# Patient Record
Sex: MALE | Race: ASIAN | NOT HISPANIC OR LATINO | Employment: UNEMPLOYED | URBAN - METROPOLITAN AREA
[De-identification: names, ages, dates, MRNs, and addresses within clinical notes are randomized per-mention and may not be internally consistent; named-entity substitution may affect disease eponyms.]

---

## 2019-07-09 ENCOUNTER — MEDICAL CORRESPONDENCE (OUTPATIENT)
Dept: HEALTH INFORMATION MANAGEMENT | Facility: CLINIC | Age: 59
End: 2019-07-09

## 2019-07-09 ENCOUNTER — TRANSFERRED RECORDS (OUTPATIENT)
Dept: HEALTH INFORMATION MANAGEMENT | Facility: CLINIC | Age: 59
End: 2019-07-09

## 2019-10-02 ENCOUNTER — HEALTH MAINTENANCE LETTER (OUTPATIENT)
Age: 59
End: 2019-10-02

## 2019-11-07 ENCOUNTER — DOCUMENTATION ONLY (OUTPATIENT)
Dept: CARE COORDINATION | Facility: CLINIC | Age: 59
End: 2019-11-07

## 2019-11-14 ENCOUNTER — PRE VISIT (OUTPATIENT)
Dept: ORTHOPEDICS | Facility: CLINIC | Age: 59
End: 2019-11-14

## 2020-02-19 ENCOUNTER — TRANSCRIBE ORDERS (OUTPATIENT)
Dept: OTHER | Age: 60
End: 2020-02-19

## 2020-02-19 DIAGNOSIS — E11.9 TYPE II DIABETES MELLITUS (H): Primary | ICD-10-CM

## 2020-03-04 ENCOUNTER — PRE VISIT (OUTPATIENT)
Dept: ORTHOPEDICS | Facility: CLINIC | Age: 60
End: 2020-03-04

## 2020-03-22 ENCOUNTER — HEALTH MAINTENANCE LETTER (OUTPATIENT)
Age: 60
End: 2020-03-22

## 2020-06-24 ENCOUNTER — TRANSCRIBE ORDERS (OUTPATIENT)
Dept: OTHER | Age: 60
End: 2020-06-24

## 2020-06-24 DIAGNOSIS — E11.9 TYPE 2 DIABETES MELLITUS (H): Primary | ICD-10-CM

## 2020-06-29 ENCOUNTER — ANCILLARY PROCEDURE (OUTPATIENT)
Dept: ULTRASOUND IMAGING | Facility: CLINIC | Age: 60
End: 2020-06-29
Attending: INTERNAL MEDICINE
Payer: COMMERCIAL

## 2020-06-29 DIAGNOSIS — N18.30 CHRONIC KIDNEY DISEASE, STAGE III (MODERATE) (H): ICD-10-CM

## 2020-06-29 LAB — RADIOLOGIST FLAGS: NORMAL

## 2021-01-15 ENCOUNTER — HEALTH MAINTENANCE LETTER (OUTPATIENT)
Age: 61
End: 2021-01-15

## 2021-05-16 ENCOUNTER — HEALTH MAINTENANCE LETTER (OUTPATIENT)
Age: 61
End: 2021-05-16

## 2021-09-05 ENCOUNTER — HEALTH MAINTENANCE LETTER (OUTPATIENT)
Age: 61
End: 2021-09-05

## 2021-11-08 ENCOUNTER — LAB REQUISITION (OUTPATIENT)
Dept: LAB | Facility: CLINIC | Age: 61
End: 2021-11-08
Payer: COMMERCIAL

## 2021-11-08 DIAGNOSIS — I50.33 ACUTE ON CHRONIC DIASTOLIC (CONGESTIVE) HEART FAILURE (H): ICD-10-CM

## 2021-11-08 LAB
ANION GAP SERPL CALCULATED.3IONS-SCNC: 6 MMOL/L (ref 3–14)
BUN SERPL-MCNC: 14 MG/DL (ref 7–30)
CALCIUM SERPL-MCNC: 8.8 MG/DL (ref 8.5–10.1)
CHLORIDE BLD-SCNC: 104 MMOL/L (ref 94–109)
CHOLEST SERPL-MCNC: 171 MG/DL
CO2 SERPL-SCNC: 27 MMOL/L (ref 20–32)
CREAT SERPL-MCNC: 1.05 MG/DL (ref 0.66–1.25)
FASTING STATUS PATIENT QL REPORTED: NO
GFR SERPL CREATININE-BSD FRML MDRD: 77 ML/MIN/1.73M2
GLUCOSE BLD-MCNC: 139 MG/DL (ref 70–99)
HDLC SERPL-MCNC: 32 MG/DL
LDLC SERPL CALC-MCNC: 68 MG/DL
NONHDLC SERPL-MCNC: 139 MG/DL
POTASSIUM BLD-SCNC: 4.3 MMOL/L (ref 3.4–5.3)
SODIUM SERPL-SCNC: 137 MMOL/L (ref 133–144)
TRIGL SERPL-MCNC: 356 MG/DL

## 2021-11-08 PROCEDURE — 80061 LIPID PANEL: CPT | Mod: ORL | Performed by: INTERNAL MEDICINE

## 2021-11-08 PROCEDURE — 80048 BASIC METABOLIC PNL TOTAL CA: CPT | Mod: ORL | Performed by: INTERNAL MEDICINE

## 2021-12-25 ENCOUNTER — HEALTH MAINTENANCE LETTER (OUTPATIENT)
Age: 61
End: 2021-12-25

## 2022-02-19 ENCOUNTER — HEALTH MAINTENANCE LETTER (OUTPATIENT)
Age: 62
End: 2022-02-19

## 2022-02-28 ENCOUNTER — LAB REQUISITION (OUTPATIENT)
Dept: LAB | Facility: CLINIC | Age: 62
End: 2022-02-28
Payer: COMMERCIAL

## 2022-02-28 DIAGNOSIS — I50.33 ACUTE ON CHRONIC DIASTOLIC (CONGESTIVE) HEART FAILURE (H): ICD-10-CM

## 2022-02-28 LAB
ALBUMIN SERPL-MCNC: 4 G/DL (ref 3.4–5)
ALP SERPL-CCNC: 89 U/L (ref 40–150)
ALT SERPL W P-5'-P-CCNC: 28 U/L (ref 0–70)
ANION GAP SERPL CALCULATED.3IONS-SCNC: 7 MMOL/L (ref 3–14)
AST SERPL W P-5'-P-CCNC: 19 U/L (ref 0–45)
BILIRUB SERPL-MCNC: 0.6 MG/DL (ref 0.2–1.3)
BUN SERPL-MCNC: 22 MG/DL (ref 7–30)
CALCIUM SERPL-MCNC: 9.3 MG/DL (ref 8.5–10.1)
CHLORIDE BLD-SCNC: 104 MMOL/L (ref 94–109)
CO2 SERPL-SCNC: 28 MMOL/L (ref 20–32)
CREAT SERPL-MCNC: 1.07 MG/DL (ref 0.66–1.25)
GFR SERPL CREATININE-BSD FRML MDRD: 79 ML/MIN/1.73M2
GLUCOSE BLD-MCNC: 134 MG/DL (ref 70–99)
POTASSIUM BLD-SCNC: 4.2 MMOL/L (ref 3.4–5.3)
PROT SERPL-MCNC: 7.1 G/DL (ref 6.8–8.8)
SODIUM SERPL-SCNC: 139 MMOL/L (ref 133–144)
TSH SERPL DL<=0.005 MIU/L-ACNC: 0.6 MU/L (ref 0.4–4)

## 2022-02-28 PROCEDURE — 84443 ASSAY THYROID STIM HORMONE: CPT | Performed by: INTERNAL MEDICINE

## 2022-02-28 PROCEDURE — 80053 COMPREHEN METABOLIC PANEL: CPT | Mod: ORL | Performed by: INTERNAL MEDICINE

## 2022-02-28 PROCEDURE — 82040 ASSAY OF SERUM ALBUMIN: CPT | Performed by: INTERNAL MEDICINE

## 2022-04-04 ENCOUNTER — LAB REQUISITION (OUTPATIENT)
Dept: LAB | Facility: CLINIC | Age: 62
End: 2022-04-04
Payer: COMMERCIAL

## 2022-04-04 DIAGNOSIS — I15.9 SECONDARY HYPERTENSION, UNSPECIFIED: ICD-10-CM

## 2022-04-04 DIAGNOSIS — E11.9 TYPE 2 DIABETES MELLITUS WITHOUT COMPLICATIONS (H): ICD-10-CM

## 2022-04-04 LAB
CREAT UR-MCNC: 168 MG/DL
MAGNESIUM SERPL-MCNC: 2 MG/DL (ref 1.6–2.3)
MICROALBUMIN UR-MCNC: 1130 MG/L
MICROALBUMIN/CREAT UR: 672.62 MG/G CR (ref 0–17)

## 2022-04-04 PROCEDURE — 83735 ASSAY OF MAGNESIUM: CPT | Mod: ORL | Performed by: INTERNAL MEDICINE

## 2022-04-04 PROCEDURE — 82043 UR ALBUMIN QUANTITATIVE: CPT | Mod: ORL | Performed by: INTERNAL MEDICINE

## 2022-04-05 DIAGNOSIS — G47.30 SLEEP APNEA: Primary | ICD-10-CM

## 2022-04-16 ENCOUNTER — HEALTH MAINTENANCE LETTER (OUTPATIENT)
Age: 62
End: 2022-04-16

## 2022-05-23 ENCOUNTER — MEDICAL CORRESPONDENCE (OUTPATIENT)
Dept: HEALTH INFORMATION MANAGEMENT | Facility: CLINIC | Age: 62
End: 2022-05-23
Payer: COMMERCIAL

## 2022-06-01 ENCOUNTER — TRANSCRIBE ORDERS (OUTPATIENT)
Dept: OTHER | Age: 62
End: 2022-06-01
Payer: COMMERCIAL

## 2022-06-01 DIAGNOSIS — B35.1 TOENAIL FUNGUS: Primary | ICD-10-CM

## 2022-06-09 ENCOUNTER — LAB REQUISITION (OUTPATIENT)
Dept: LAB | Facility: CLINIC | Age: 62
End: 2022-06-09
Payer: COMMERCIAL

## 2022-06-09 DIAGNOSIS — I15.9 SECONDARY HYPERTENSION, UNSPECIFIED: ICD-10-CM

## 2022-06-09 LAB
ANION GAP SERPL CALCULATED.3IONS-SCNC: 10 MMOL/L (ref 3–14)
BUN SERPL-MCNC: 13 MG/DL (ref 7–30)
CALCIUM SERPL-MCNC: 9.6 MG/DL (ref 8.5–10.1)
CHLORIDE BLD-SCNC: 103 MMOL/L (ref 94–109)
CO2 SERPL-SCNC: 26 MMOL/L (ref 20–32)
CREAT SERPL-MCNC: 0.9 MG/DL (ref 0.66–1.25)
GFR SERPL CREATININE-BSD FRML MDRD: >90 ML/MIN/1.73M2
GLUCOSE BLD-MCNC: 162 MG/DL (ref 70–99)
POTASSIUM BLD-SCNC: 3.4 MMOL/L (ref 3.4–5.3)
SODIUM SERPL-SCNC: 139 MMOL/L (ref 133–144)

## 2022-06-09 PROCEDURE — 80048 BASIC METABOLIC PNL TOTAL CA: CPT | Mod: ORL | Performed by: INTERNAL MEDICINE

## 2022-07-12 ENCOUNTER — TRANSCRIBE ORDERS (OUTPATIENT)
Dept: OTHER | Age: 62
End: 2022-07-12

## 2022-07-12 DIAGNOSIS — Z00.00 HEALTHCARE MAINTENANCE: Primary | ICD-10-CM

## 2022-07-14 ENCOUNTER — HOSPITAL ENCOUNTER (OUTPATIENT)
Facility: AMBULATORY SURGERY CENTER | Age: 62
End: 2022-07-14
Attending: STUDENT IN AN ORGANIZED HEALTH CARE EDUCATION/TRAINING PROGRAM
Payer: COMMERCIAL

## 2022-07-14 ENCOUNTER — TELEPHONE (OUTPATIENT)
Dept: GASTROENTEROLOGY | Facility: CLINIC | Age: 62
End: 2022-07-14

## 2022-07-14 DIAGNOSIS — Z12.11 COLON CANCER SCREENING: Primary | ICD-10-CM

## 2022-07-14 NOTE — TELEPHONE ENCOUNTER
Screening Questions    BlueKIND OF PREP RedLOCATION [review exclusion criteria] GreenSEDATION TYPE      1. Are you active on mychart? n    2. What insurance is in the chart? [employer]     3.   Ordering/Referring Provider: Kassidy    4. BMI   (If greater than 40 review exclusion criteria [PAC APPT IF [MAC] @ [facility])  30.5  [If yes, BMI OVER 40-EXTENDED PREP]      **(Sedation review/consideration needed)**  Do you have a legal guardian or Medical Power of    and/or are you able to give consent for your medical care?     y    5. Have you had a positive covid test in the last 90 days?   n - n    6.  Are you currently on dialysis?   n [ If yes, G-PREP & HOSPITAL setting ONLY]     7.  Do you have chronic kidney disease?  n [ If yes, G-PREP ]    8.   Do you have a diagnosis of diabetes?   n   [ If yes, G-PREP ]    9.  On a regular basis do you go 3-5 days between bowel movements?   y   [ If yes, EXTENDED PREP]    10.  Are you taking any prescription pain medications on a routine schedule?    y -  [ If yes, EXTENDED PREP] [If yes, MAC]      11.   Do you have any chemical dependencies such as alcohol, street drugs, or methadone?    n [If yes, MAC]    12.   Do you have any history of post-traumatic stress syndrome, severe anxiety or history of psychosis?    n  [If yes, MAC]    13.  [FEMALES] Are you currently pregnant?     If yes, how many weeks?       Respiratory/Heart Screening:  [If yes to any of the following HOSPITAL setting only]     14. Do you have Pulmonary Hypertension [Lungs]?   n       15. Do you have UNCONTROLLED asthma?   n     16.  Do you use daily home oxygen?  n      17. Do you have mod to severe Obstructive Sleep Apnea?         (OKAY @ [facility]  [facility]  SH  PH  RI  MG - if pt is not on OXYGEN)  n      18.   Have you had a heart or lung transplant?   n      19.   Have you had a stroke or Transient ischemic attack (TIA - aka  mini stroke ) within 6 months?  (If yes, please review exclusion criteria)  n     20.    In the past 6 months, have you had any heart related issues including cardiomyopathy or heart attack?   n           If yes, did it require cardiac stenting or other implantable device?   n      21.   Do you have any implantable devices in your body (pacemaker, defib, LVAD)? (If yes, please review exclusion criteria)  n     22. Do you take nitroglycerin?   n           If yes, how often? n  (if yes, HOSPITAL setting ONLY)    23.  Are you currently taking any blood thinners?    [If yes, INFORM patient to follw up w/ ORDERING PROVIDER FOR BRIDGING INSTRUCTIONS]     n    24.   Do you transfer independently?                (If NO, please HOSPITAL setting ONLY)  y    25.   Preferred LOCAL Pharmacy for Pre Prescription:         [id number] - [place], [place] - [address]  [facility] [place], [place] [address]  [id number] [place], [place] - [address] AT [facility] & [address] [place] [address] OUTPATIENT SPECIALTY PHARMACY  [address]    Scheduling Details  (Please ask for phone number if not scheduled by patient)      Caller : Ag Wright    Date of Procedure: 9/1  Surgeon: Jeanette  Location: [place]        Sedation Type: MAC l   Conscious Sedation- Needs  for 6 hours after the procedure  MAC/General-Needs  for 24 hours after procedure    n :[Pre-op Required] at [facility]  SH  MG and OR for MAC sedation   (advise patient they will need a pre-op WITH IN 30 DAYS of procedure date)     Type of Procedure Scheduled:   Lower Endoscopy [Colonoscopy]    Which Colonoscopy Prep was Sent?:   ext    [facility]OR[facility]S CF PATIENTS & GROEN'S PATIENTS NEEDS EXTENDED PREP       Informed patient they will need an adult  y  Cannot take any type of public or medical transportation alone    Pre-Procedure Covid test to be completed at [facility] or Externally: y  **INFORMED OF HOME TESTING & LAB  OPTION**        Confirmed Nurse will call to complete assessment y    Additional comments:

## 2022-07-27 ENCOUNTER — VIRTUAL VISIT (OUTPATIENT)
Dept: SLEEP MEDICINE | Facility: CLINIC | Age: 62
End: 2022-07-27
Attending: INTERNAL MEDICINE
Payer: COMMERCIAL

## 2022-07-27 VITALS — HEIGHT: 67 IN | WEIGHT: 180 LBS | BODY MASS INDEX: 28.25 KG/M2

## 2022-07-27 DIAGNOSIS — G47.33 OSA (OBSTRUCTIVE SLEEP APNEA): ICD-10-CM

## 2022-07-27 DIAGNOSIS — G47.31 CENTRAL SLEEP APNEA: Primary | ICD-10-CM

## 2022-07-27 PROCEDURE — 99203 OFFICE O/P NEW LOW 30 MIN: CPT | Mod: 95 | Performed by: PSYCHIATRY & NEUROLOGY

## 2022-07-27 ASSESSMENT — PAIN SCALES - GENERAL: PAINLEVEL: EXTREME PAIN (8)

## 2022-07-27 ASSESSMENT — SLEEP AND FATIGUE QUESTIONNAIRES
HOW LIKELY ARE YOU TO NOD OFF OR FALL ASLEEP WHEN YOU ARE A PASSENGER IN A CAR FOR AN HOUR WITHOUT A BREAK: HIGH CHANCE OF DOZING
HOW LIKELY ARE YOU TO NOD OFF OR FALL ASLEEP WHILE SITTING AND TALKING TO SOMEONE: WOULD NEVER DOZE
HOW LIKELY ARE YOU TO NOD OFF OR FALL ASLEEP WHILE WATCHING TV: MODERATE CHANCE OF DOZING
HOW LIKELY ARE YOU TO NOD OFF OR FALL ASLEEP WHILE SITTING QUIETLY AFTER LUNCH WITHOUT ALCOHOL: HIGH CHANCE OF DOZING
HOW LIKELY ARE YOU TO NOD OFF OR FALL ASLEEP WHILE SITTING INACTIVE IN A PUBLIC PLACE: WOULD NEVER DOZE
HOW LIKELY ARE YOU TO NOD OFF OR FALL ASLEEP WHILE LYING DOWN TO REST IN THE AFTERNOON WHEN CIRCUMSTANCES PERMIT: WOULD NEVER DOZE
HOW LIKELY ARE YOU TO NOD OFF OR FALL ASLEEP WHILE SITTING AND READING: MODERATE CHANCE OF DOZING
HOW LIKELY ARE YOU TO NOD OFF OR FALL ASLEEP IN A CAR, WHILE STOPPED FOR A FEW MINUTES IN TRAFFIC: WOULD NEVER DOZE

## 2022-07-27 NOTE — PROGRESS NOTES
Ag is a 61 year old who is being evaluated via a billable video visit.      How would you like to obtain your AVS? MyChart  If the video visit is dropped, the invitation should be resent by: Text to cell phone: [phone]  Will anyone else be joining your video visit? No  Veto Kennedy    Video-Visit Details    Video Start Time: 0951    Type of service:  Video Visit    Video End Time:1008    Originating Location (pt. Location): Home    Distant Location (provider location):  [facility] [place]     Platform used for Video Visit: View the Space     Brief sleep staff note    Patient here for second opinion regarding WILFREDO.     Patient was diagnosed with WILFREDO back at least 12 years ago.  We do not have records.  He tried CPAP and was unable to tolerate it and quite within a couple of months of starting.     Does not recall any discussion about alternative therapies for WILFREDO.      He lost a significant amount of weight but is now quite tired and snores and still apparently has witnessed apneas.  He has HTN.     Also on opioids for chronic pain increasing his risk for central apneas.     Otherwise he does not describe other underlying sleep concerns.     Family history of WILFREDO in his daughter.     Assessment/Plan  Possible sleep disordered breathing in particular WILFREDO but also central apnea from opioids.  STOP BANG score of 6.   Discussed the pathophysiology, investigation and management of sleep disordered breathing.   Discussed in-lab PSG v. Home Sleep Apnea Testing.    Discussed Positive Airway Pressure Therapy, Oral Appliance and ENT options.     All questions were answered.    It is a great privilege being asked to participate in this patients care.  The patient has been advised on the importance in of never operating operating a motor vehicle while tired or sleepy.        I visited with the patient directly but also extensively reviewed chart and coordinated care. Total time spent in the care of this  patient today (Face-to-Face time + chart time, , and coordination of care) was 32 minutes.

## 2022-08-06 ENCOUNTER — HEALTH MAINTENANCE LETTER (OUTPATIENT)
Age: 62
End: 2022-08-06

## 2022-08-22 ENCOUNTER — TELEPHONE (OUTPATIENT)
Dept: GASTROENTEROLOGY | Facility: CLINIC | Age: 62
End: 2022-08-22

## 2022-08-22 DIAGNOSIS — Z12.11 SPECIAL SCREENING FOR MALIGNANT NEOPLASMS, COLON: Primary | ICD-10-CM

## 2022-08-22 RX ORDER — BISACODYL 5 MG
1 TABLET, DELAYED RELEASE (ENTERIC COATED) ORAL SEE ADMIN INSTRUCTIONS
Qty: 4 TABLET | Refills: 0 | Status: SHIPPED | OUTPATIENT
Start: 2022-08-22

## 2022-08-22 NOTE — TELEPHONE ENCOUNTER
Patient scheduled for colonoscopy on 9.1.22.     Covid test scheduled 8.29.22. Discuss at home test option.     Arrival time: 1215    Facility location: [facility] [place]    Sedation type: MAC    Indication for procedure: screening    Anticoagulations? verify     Bowel prep recommendation: Extended d/t narcotic use.    Extended golytely prep sent to [facility] pharmacy on [address] in [facility].     Pre visit planning completed.    Veronique Ortiz RN

## 2022-08-22 NOTE — TELEPHONE ENCOUNTER
Attempted to contact patient regarding upcoming colonoscopy procedure on 9.1.22 for pre assessment questions. No answer.     Left message to return call to [phone] #3    Veronique Ortiz RN

## 2022-08-26 NOTE — TELEPHONE ENCOUNTER
Attempted to contact patient regarding upcoming colonoscopy procedure on 9.1.22 for pre assessment questions. No answer.     Patient does not appear to be MyChart active - last log in 3.16.2015 per chart.    Left message to return call to [phone] #4    Veronique Ortiz RN

## 2022-08-28 NOTE — TELEPHONE ENCOUNTER
DIAGNOSIS: Trigger finger, requesting injection ref by Jasmin Aguilar [facility]   APPOINTMENT DATE: 11/8   NOTES STATUS DETAILS   OFFICE NOTE from referring provider recieved [facility]( scanned to him and copy in providers right fax folder)   OFFICE NOTE from other specialist n/a    DISCHARGE SUMMARY from hospital n/a    DISCHARGE REPORT from the ER n/a    OPERATIVE REPORT n/a    MEDICATION LIST recieved    MRI n/a    CT SCAN n/a    XRAYS (IMAGES & REPORTS) n/a            Called [facility] said they will work on sending records 11/8  Called [facility] 3x today no response left voicemail 11/13  Talked to someone from [facility] they said the will fax records [id number]  
None

## 2022-08-29 RX ORDER — LIDOCAINE 40 MG/G
CREAM TOPICAL
Status: CANCELLED | OUTPATIENT
Start: 2022-08-29

## 2022-08-29 RX ORDER — ONDANSETRON 2 MG/ML
4 INJECTION INTRAMUSCULAR; INTRAVENOUS
Status: CANCELLED | OUTPATIENT
Start: 2022-08-29

## 2022-08-29 RX ORDER — SODIUM CHLORIDE, SODIUM LACTATE, POTASSIUM CHLORIDE, CALCIUM CHLORIDE 600; 310; 30; 20 MG/100ML; MG/100ML; MG/100ML; MG/100ML
INJECTION, SOLUTION INTRAVENOUS CONTINUOUS
Status: CANCELLED | OUTPATIENT
Start: 2022-08-29

## 2022-08-30 ENCOUNTER — TELEPHONE (OUTPATIENT)
Dept: GASTROENTEROLOGY | Facility: CLINIC | Age: 62
End: 2022-08-30

## 2022-08-30 NOTE — TELEPHONE ENCOUNTER
Pre assessment call placed to patient to go over upcoming colonoscopy procedure for 9.1.22.    Patient states will need to reschedule. Message to scheduling to cancel per patient request, and he states will call back to reschedule.

## 2022-08-30 NOTE — TELEPHONE ENCOUNTER
Caller: message from nurses    Procedure: colon    Date, Location, and Surgeon of Procedure Cancelled: 9/1/22 [facility]    Ordering Provider:Bridger    Reason for cancel (please be detailed, any staff messages or encounters to note?): It didn't say/he will call back to reschedule    Veronique Ortiz RN  P Endoscopy Scheduling Pool  Hello,     Please cancel per patient request his 9.1.22 colonoscopy. He states he will call back to reschedule.     Thank you!     Veronique           Rescheduled: n     If rescheduled:    Date:    Location:    Prep Resent: (changes to prep?)   Covid Test Rescheduled:    Note any change or update to original order/sedation:

## 2022-10-22 ENCOUNTER — HEALTH MAINTENANCE LETTER (OUTPATIENT)
Age: 62
End: 2022-10-22

## 2022-11-28 ENCOUNTER — LAB REQUISITION (OUTPATIENT)
Dept: LAB | Facility: CLINIC | Age: 62
End: 2022-11-28
Payer: COMMERCIAL

## 2022-11-28 DIAGNOSIS — Z13.220 ENCOUNTER FOR SCREENING FOR LIPOID DISORDERS: ICD-10-CM

## 2022-11-28 LAB
CHOLEST SERPL-MCNC: 158 MG/DL
HDLC SERPL-MCNC: 30 MG/DL
LDLC SERPL CALC-MCNC: 82 MG/DL
NONHDLC SERPL-MCNC: 128 MG/DL
TRIGL SERPL-MCNC: 231 MG/DL

## 2022-11-28 PROCEDURE — 80061 LIPID PANEL: CPT | Mod: ORL | Performed by: INTERNAL MEDICINE

## 2022-12-10 ENCOUNTER — HEALTH MAINTENANCE LETTER (OUTPATIENT)
Age: 62
End: 2022-12-10

## 2022-12-29 ENCOUNTER — LAB REQUISITION (OUTPATIENT)
Dept: LAB | Facility: CLINIC | Age: 62
End: 2022-12-29
Payer: COMMERCIAL

## 2022-12-29 DIAGNOSIS — I10 ESSENTIAL (PRIMARY) HYPERTENSION: ICD-10-CM

## 2022-12-29 LAB
ALBUMIN SERPL BCG-MCNC: 5 G/DL (ref 3.5–5.2)
ALP SERPL-CCNC: 105 U/L (ref 40–129)
ALT SERPL W P-5'-P-CCNC: 25 U/L (ref 10–50)
ANION GAP SERPL CALCULATED.3IONS-SCNC: 17 MMOL/L (ref 7–15)
AST SERPL W P-5'-P-CCNC: 21 U/L (ref 10–50)
BILIRUB SERPL-MCNC: 1.4 MG/DL
BUN SERPL-MCNC: 17.5 MG/DL (ref 8–23)
CALCIUM SERPL-MCNC: 10 MG/DL (ref 8.8–10.2)
CHLORIDE SERPL-SCNC: 92 MMOL/L (ref 98–107)
CREAT SERPL-MCNC: 0.97 MG/DL (ref 0.67–1.17)
DEPRECATED HCO3 PLAS-SCNC: 23 MMOL/L (ref 22–29)
GFR SERPL CREATININE-BSD FRML MDRD: 89 ML/MIN/1.73M2
GLUCOSE SERPL-MCNC: 171 MG/DL (ref 70–99)
POTASSIUM SERPL-SCNC: 4.6 MMOL/L (ref 3.4–5.3)
PROT SERPL-MCNC: 8 G/DL (ref 6.4–8.3)
SODIUM SERPL-SCNC: 132 MMOL/L (ref 136–145)

## 2022-12-29 PROCEDURE — 80053 COMPREHEN METABOLIC PANEL: CPT | Performed by: INTERNAL MEDICINE

## 2023-01-24 ENCOUNTER — TRANSCRIBE ORDERS (OUTPATIENT)
Dept: OTHER | Age: 63
End: 2023-01-24

## 2023-01-24 DIAGNOSIS — E11.9 TYPE 2 DIABETES MELLITUS WITHOUT COMPLICATION, WITHOUT LONG-TERM CURRENT USE OF INSULIN (H): Primary | ICD-10-CM

## 2023-04-01 ENCOUNTER — HEALTH MAINTENANCE LETTER (OUTPATIENT)
Age: 63
End: 2023-04-01

## 2023-04-17 ENCOUNTER — LAB REQUISITION (OUTPATIENT)
Dept: LAB | Facility: CLINIC | Age: 63
End: 2023-04-17
Payer: COMMERCIAL

## 2023-04-17 DIAGNOSIS — Z79.4 LONG TERM (CURRENT) USE OF INSULIN (H): ICD-10-CM

## 2023-04-17 DIAGNOSIS — E11.9 TYPE 2 DIABETES MELLITUS WITHOUT COMPLICATIONS (H): ICD-10-CM

## 2023-04-17 LAB
CREAT UR-MCNC: 60.3 MG/DL
MICROALBUMIN UR-MCNC: 29.8 MG/L
MICROALBUMIN/CREAT UR: 49.42 MG/G CR (ref 0–17)

## 2023-04-17 PROCEDURE — 82570 ASSAY OF URINE CREATININE: CPT | Performed by: INTERNAL MEDICINE

## 2023-06-22 ENCOUNTER — LAB REQUISITION (OUTPATIENT)
Dept: LAB | Facility: CLINIC | Age: 63
End: 2023-06-22
Payer: COMMERCIAL

## 2023-06-22 DIAGNOSIS — M54.50 LOW BACK PAIN, UNSPECIFIED: ICD-10-CM

## 2023-06-22 DIAGNOSIS — G89.29 OTHER CHRONIC PAIN: ICD-10-CM

## 2023-06-22 LAB
ALBUMIN SERPL BCG-MCNC: 4.9 G/DL (ref 3.5–5.2)
ANION GAP SERPL CALCULATED.3IONS-SCNC: 19 MMOL/L (ref 7–15)
BUN SERPL-MCNC: 18.7 MG/DL (ref 8–23)
CALCIUM SERPL-MCNC: 9.7 MG/DL (ref 8.8–10.2)
CHLORIDE SERPL-SCNC: 95 MMOL/L (ref 98–107)
CREAT SERPL-MCNC: 1.45 MG/DL (ref 0.67–1.17)
DEPRECATED HCO3 PLAS-SCNC: 24 MMOL/L (ref 22–29)
GFR SERPL CREATININE-BSD FRML MDRD: 55 ML/MIN/1.73M2
GLUCOSE SERPL-MCNC: 150 MG/DL (ref 70–99)
PHOSPHATE SERPL-MCNC: 3.6 MG/DL (ref 2.5–4.5)
POTASSIUM SERPL-SCNC: 4.2 MMOL/L (ref 3.4–5.3)
PSA SERPL DL<=0.01 NG/ML-MCNC: 6.58 NG/ML (ref 0–4.5)
SODIUM SERPL-SCNC: 138 MMOL/L (ref 136–145)

## 2023-06-22 PROCEDURE — 80069 RENAL FUNCTION PANEL: CPT | Mod: ORL | Performed by: NURSE PRACTITIONER

## 2023-06-22 PROCEDURE — G0103 PSA SCREENING: HCPCS | Mod: ORL | Performed by: NURSE PRACTITIONER

## 2023-06-23 ENCOUNTER — TRANSCRIBE ORDERS (OUTPATIENT)
Dept: OTHER | Age: 63
End: 2023-06-23

## 2023-06-23 DIAGNOSIS — Z12.11 COLON CANCER SCREENING: Primary | ICD-10-CM

## 2023-06-27 ENCOUNTER — MEDICAL CORRESPONDENCE (OUTPATIENT)
Dept: HEALTH INFORMATION MANAGEMENT | Facility: CLINIC | Age: 63
End: 2023-06-27
Payer: COMMERCIAL

## 2023-06-29 ENCOUNTER — TRANSCRIBE ORDERS (OUTPATIENT)
Dept: OTHER | Age: 63
End: 2023-06-29

## 2023-06-29 DIAGNOSIS — R97.20 ELEVATED PSA: Primary | ICD-10-CM

## 2023-07-17 ENCOUNTER — LAB REQUISITION (OUTPATIENT)
Dept: LAB | Facility: CLINIC | Age: 63
End: 2023-07-17
Payer: COMMERCIAL

## 2023-07-17 DIAGNOSIS — Z79.4 LONG TERM (CURRENT) USE OF INSULIN (H): ICD-10-CM

## 2023-07-17 DIAGNOSIS — E11.9 TYPE 2 DIABETES MELLITUS WITHOUT COMPLICATIONS (H): ICD-10-CM

## 2023-07-17 DIAGNOSIS — I15.9 SECONDARY HYPERTENSION, UNSPECIFIED: ICD-10-CM

## 2023-07-17 LAB
ALBUMIN SERPL BCG-MCNC: 5.2 G/DL (ref 3.5–5.2)
ANION GAP SERPL CALCULATED.3IONS-SCNC: 18 MMOL/L (ref 7–15)
BUN SERPL-MCNC: 20.4 MG/DL (ref 8–23)
CALCIUM SERPL-MCNC: 9.8 MG/DL (ref 8.8–10.2)
CHLORIDE SERPL-SCNC: 96 MMOL/L (ref 98–107)
CREAT SERPL-MCNC: 1.13 MG/DL (ref 0.67–1.17)
DEPRECATED HCO3 PLAS-SCNC: 22 MMOL/L (ref 22–29)
GFR SERPL CREATININE-BSD FRML MDRD: 74 ML/MIN/1.73M2
GLUCOSE SERPL-MCNC: 146 MG/DL (ref 70–99)
PHOSPHATE SERPL-MCNC: 3.4 MG/DL (ref 2.5–4.5)
POTASSIUM SERPL-SCNC: 4 MMOL/L (ref 3.4–5.3)
SODIUM SERPL-SCNC: 136 MMOL/L (ref 136–145)
TSH SERPL DL<=0.005 MIU/L-ACNC: 0.75 UIU/ML (ref 0.3–4.2)

## 2023-07-17 PROCEDURE — 80069 RENAL FUNCTION PANEL: CPT | Mod: ORL | Performed by: INTERNAL MEDICINE

## 2023-07-17 PROCEDURE — 84443 ASSAY THYROID STIM HORMONE: CPT | Mod: ORL | Performed by: INTERNAL MEDICINE

## 2023-08-27 ENCOUNTER — HEALTH MAINTENANCE LETTER (OUTPATIENT)
Age: 63
End: 2023-08-27

## 2024-01-14 ENCOUNTER — HEALTH MAINTENANCE LETTER (OUTPATIENT)
Age: 64
End: 2024-01-14

## 2024-05-13 ENCOUNTER — MEDICAL CORRESPONDENCE (OUTPATIENT)
Dept: HEALTH INFORMATION MANAGEMENT | Facility: CLINIC | Age: 64
End: 2024-05-13
Payer: COMMERCIAL

## 2024-05-15 ENCOUNTER — TRANSCRIBE ORDERS (OUTPATIENT)
Dept: OTHER | Age: 64
End: 2024-05-15

## 2024-05-15 ENCOUNTER — TELEPHONE (OUTPATIENT)
Dept: OCCUPATIONAL THERAPY | Facility: CLINIC | Age: 64
End: 2024-05-15
Payer: COMMERCIAL

## 2024-05-15 DIAGNOSIS — M25.561 ACUTE PAIN OF RIGHT KNEE: Primary | ICD-10-CM

## 2024-05-15 DIAGNOSIS — E11.9 TYPE 2 DIABETES MELLITUS WITHOUT COMPLICATION, WITHOUT LONG-TERM CURRENT USE OF INSULIN (H): Primary | ICD-10-CM

## 2024-05-20 ENCOUNTER — TRANSCRIBE ORDERS (OUTPATIENT)
Dept: URGENT CARE | Facility: CLINIC | Age: 64
End: 2024-05-20
Payer: COMMERCIAL

## 2024-05-20 DIAGNOSIS — Z74.09 LIMITED MOBILITY: Primary | ICD-10-CM

## 2024-05-20 DIAGNOSIS — M48.00 SPINAL STENOSIS: ICD-10-CM

## 2024-05-20 DIAGNOSIS — M48.062 SPINAL STENOSIS, LUMBAR REGION, WITH NEUROGENIC CLAUDICATION: ICD-10-CM

## 2024-06-02 ENCOUNTER — HEALTH MAINTENANCE LETTER (OUTPATIENT)
Age: 64
End: 2024-06-02

## 2024-06-10 ENCOUNTER — LAB REQUISITION (OUTPATIENT)
Dept: LAB | Facility: CLINIC | Age: 64
End: 2024-06-10
Payer: COMMERCIAL

## 2024-06-10 DIAGNOSIS — E11.9 TYPE 2 DIABETES MELLITUS WITHOUT COMPLICATIONS (H): ICD-10-CM

## 2024-06-10 LAB
CREAT UR-MCNC: 91.8 MG/DL
MICROALBUMIN UR-MCNC: <12 MG/L
MICROALBUMIN/CREAT UR: NORMAL MG/G{CREAT}

## 2024-06-10 PROCEDURE — 82570 ASSAY OF URINE CREATININE: CPT | Mod: ORL | Performed by: INTERNAL MEDICINE

## 2024-07-10 ENCOUNTER — OFFICE VISIT (OUTPATIENT)
Dept: OPHTHALMOLOGY | Facility: CLINIC | Age: 64
End: 2024-07-10
Attending: INTERNAL MEDICINE
Payer: COMMERCIAL

## 2024-07-10 DIAGNOSIS — H02.831 DERMATOCHALASIS OF BOTH UPPER EYELIDS: ICD-10-CM

## 2024-07-10 DIAGNOSIS — H52.203 MYOPIA OF BOTH EYES WITH ASTIGMATISM AND PRESBYOPIA: Primary | ICD-10-CM

## 2024-07-10 DIAGNOSIS — H01.014 ULCERATIVE BLEPHARITIS OF UPPER EYELIDS OF BOTH EYES: ICD-10-CM

## 2024-07-10 DIAGNOSIS — H02.834 DERMATOCHALASIS OF BOTH UPPER EYELIDS: ICD-10-CM

## 2024-07-10 DIAGNOSIS — E11.3219 TYPE 2 DIABETES MELLITUS WITH MILD NONPROLIFERATIVE RETINOPATHY AND MACULAR EDEMA, WITHOUT LONG-TERM CURRENT USE OF INSULIN, UNSPECIFIED LATERALITY (H): ICD-10-CM

## 2024-07-10 DIAGNOSIS — H52.4 MYOPIA OF BOTH EYES WITH ASTIGMATISM AND PRESBYOPIA: Primary | ICD-10-CM

## 2024-07-10 DIAGNOSIS — H52.13 MYOPIA OF BOTH EYES WITH ASTIGMATISM AND PRESBYOPIA: Primary | ICD-10-CM

## 2024-07-10 DIAGNOSIS — H01.011 ULCERATIVE BLEPHARITIS OF UPPER EYELIDS OF BOTH EYES: ICD-10-CM

## 2024-07-10 DIAGNOSIS — H35.073 TYPE 2 MACULAR TELANGIECTASIS OF BOTH EYES: ICD-10-CM

## 2024-07-10 PROCEDURE — G0463 HOSPITAL OUTPT CLINIC VISIT: HCPCS | Performed by: OPHTHALMOLOGY

## 2024-07-10 PROCEDURE — 92134 CPTRZ OPH DX IMG PST SGM RTA: CPT | Mod: 26 | Performed by: OPHTHALMOLOGY

## 2024-07-10 PROCEDURE — 92004 COMPRE OPH EXAM NEW PT 1/>: CPT | Performed by: OPHTHALMOLOGY

## 2024-07-10 PROCEDURE — 92134 CPTRZ OPH DX IMG PST SGM RTA: CPT | Performed by: OPHTHALMOLOGY

## 2024-07-10 ASSESSMENT — REFRACTION_WEARINGRX
OS_CYLINDER: +2.25
OS_ADD: +1.75
OD_ADD: +1.75
OD_SPHERE: -2.75
OS_AXIS: 005
OD_AXIS: 002
OS_SPHERE: -2.50
OD_CYLINDER: +2.00

## 2024-07-10 ASSESSMENT — REFRACTION_MANIFEST
OD_CYLINDER: +2.50
OD_AXIS: 175
OS_SPHERE: -2.50
OD_SPHERE: -2.25
OS_AXIS: 013
OS_CYLINDER: +2.75
OS_ADD: +2.25
OD_ADD: +2.25

## 2024-07-10 ASSESSMENT — TONOMETRY
IOP_METHOD: TONOPEN
OS_IOP_MMHG: 15
OD_IOP_MMHG: 15

## 2024-07-10 ASSESSMENT — VISUAL ACUITY
OD_CC+: -1
OS_CC+: -1
OD_CC: 20/40
OS_CC: 20/40
CORRECTION_TYPE: GLASSES
METHOD: SNELLEN - LINEAR

## 2024-07-10 ASSESSMENT — CONF VISUAL FIELD
OD_INFERIOR_TEMPORAL_RESTRICTION: 0
METHOD: COUNTING FINGERS
OS_SUPERIOR_TEMPORAL_RESTRICTION: 0
OD_SUPERIOR_NASAL_RESTRICTION: 0
OS_INFERIOR_NASAL_RESTRICTION: 0
OS_SUPERIOR_NASAL_RESTRICTION: 0
OS_INFERIOR_TEMPORAL_RESTRICTION: 0
OD_NORMAL: 1
OS_NORMAL: 1
OD_INFERIOR_NASAL_RESTRICTION: 0
OD_SUPERIOR_TEMPORAL_RESTRICTION: 0

## 2024-07-10 ASSESSMENT — SLIT LAMP EXAM - LIDS
COMMENTS: 2+ DERMATOCHALASIS, 1+ BLEPHARITIS
COMMENTS: 2+ DERMATOCHALASIS, 1+ BLEPHARITIS

## 2024-07-10 ASSESSMENT — CUP TO DISC RATIO
OD_RATIO: 0.4
OS_RATIO: 0.3

## 2024-07-10 NOTE — PATIENT INSTRUCTIONS
Discussed warm compresses for five minute twice daily (warm wash cloth or a microwaved, uncooked bag of rice in a clean sock)   Discussed lid hygiene (Ocusoft lid scrubs or gently scrubbing the upper and lower lids baby with shampoo)

## 2024-07-10 NOTE — NURSING NOTE
Chief Complaints and History of Present Illnesses   Patient presents with    Eye Exam For Diabetes     Type 2 diabetes mellitus     Chief Complaint(s) and History of Present Illness(es)       Eye Exam For Diabetes              Laterality: both eyes    Associated symptoms: Negative for glare, haloes, dryness, eye pain, tearing, flashes and floaters    Treatments tried: artificial tears    Pain scale: 0/10    Comments: Type 2 diabetes mellitus              Comments    SHELBY was about 3-4 years.  Pt states vision is a little worse.  White light on the TV is too white.  No pain.  No flashes/floaters.  AT's PRN and allergy drops PRN.  Pt has been without glasses for about a year.    DM 2  Pt did not check BS today.  Lab Results       Component                Value               Date                       A1C                      6.7                 06/16/2006            Pt does not know last A1C.    KASI Patiño July 10, 2024 8:09 AM

## 2024-07-10 NOTE — PROGRESS NOTES
HPI       Eye Exam For Diabetes    In both eyes.  Associated symptoms include Negative for glare, haloes, dryness, eye pain, tearing, flashes and floaters.  Treatments tried include artificial tears.  Pain was noted as 0/10. Additional comments: Type 2 diabetes mellitus             Comments    SHELBY was about 3-4 years.  Pt states vision is a little worse.  White light on the TV is too white.  No pain.  No flashes/floaters.  AT's PRN and allergy drops PRN.  Pt has been without glasses for about a year.    DM 2  Pt did not check BS today.  Lab Results       Component                Value               Date                       A1C                      6.7                 06/16/2006            Pt does not know last A1C.    KASI Patiño July 10, 2024 8:09 AM              Last edited by Brien Almonte COT on 7/10/2024  8:09 AM.         Review of systems for the eyes was negative other than the pertinent positives/negatives listed in the HPI.      Assessment & Plan    HPI:  Ag Wright is a 63 year old male with history of T2DM, HTN, myopia with astigmatism and presbyopia presents for exam. He notes decreased vision since he lost his glasses last year at the [facility]. No redness, tearing, flashes or floaters      POHx: myopia with astigmatism and presbyopia  PMHx: T2DM, HTN  Current Medications:   Current Outpatient Medications   Medication Sig Dispense Refill    ALBUTEROL 90 MCG/ACT IN AERS as needed      ATENOLOL 100 MG OR TABS 1 TABLET DAILY      bisacodyl (DULCOLAX) 5 MG EC tablet Take 1 tablet (5 mg) by mouth See Admin Instructions --2 days prior to procedure, take two (2) tablets at 4 pm.  1 day prior to procedure, take two (2) tablets at 4 pm.  For additional instructions refer to you colonoscopy prep instructions. 4 tablet 0    CHLORTHALIDONE 25 MG PO TABS 1 TABLET DAILY      escitalopram (LEXAPRO) 20 MG tablet Take  by mouth. 1.5 TABLET DAILY 45 tablet 2    fenofibrate 160 MG tablet Take by mouth  daily      IBUPROFEN 600 MG PO TABS 1 TABLET 3 TIMES DAILY AS NEEDED 90 Tab 2    ketotifen (ZADITOR) 0.025 % ophthalmic solution 1 drop 3 times daily      LISINOPRIL 40 MG PO TABS 1 TABLET DAILY      METFORMIN HCL# 500 MG (MOD) OR **TB24** 1 tab twice daily      MINOXIDIL 10 MG OR TABS 1 tabs daily      oxyCODONE-acetaminophen (PERCOCET) 5-325 MG per tablet Take 1-2 tablets by mouth every 4 hours as needed for pain 30 tablet 0    polyethylene glycol (GOLYTELY) 236 g suspension Take 6,000 mLs by mouth See Admin Instructions --For instructions refer to you colonoscopy prep instructions. 8000 mL 0    sitagliptin (JANUVIA) 50 MG tablet Take 50 mg by mouth daily 1 tablets a day      ZANTAC 300 MG OR TABS 1 TABLET at bedtime      ZYRTEC 10 MG PO TABS 1 TABLET DAILY       No current facility-administered medications for this visit.     FHx: denies family history of ocular conditions   PSHx: denies history of ocular surgeries       Current Eye Medications:      Assessment & Plan:  (H52.13,  H52.203,  H52.4) Myopia of both eyes with astigmatism and presbyopia  (primary encounter diagnosis)  Patient has minimal change in myopia but a copy of today's glasses prescription was given.  The patient may wish to update the glasses if the lenses are scratched or the frames are too small.  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  Discussed ocular and systemic benefits of blood pressure and blood sugar control.      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        Nish Dickens MD     Attending Physician Attestation:  Complete documentation of historical and exam elements from today's encounter can be found in the full encounter summary report (not reduplicated in this progress note).  I personally obtained the chief complaint(s) and history of present illness.  I confirmed and edited as necessary the review of systems, past medical/surgical history, family history, social history, and examination findings as documented by others; and I examined the patient myself.  I personally reviewed the relevant tests, images, and reports as documented above.  I formulated and edited as necessary the assessment and plan and discussed the findings and management plan with the patient and family. - Nish Dickens MD

## 2024-07-23 ENCOUNTER — THERAPY VISIT (OUTPATIENT)
Dept: OCCUPATIONAL THERAPY | Facility: CLINIC | Age: 64
End: 2024-07-23
Attending: INTERNAL MEDICINE
Payer: COMMERCIAL

## 2024-07-23 DIAGNOSIS — Z78.9 IMPAIRED MOBILITY AND ADLS: Primary | ICD-10-CM

## 2024-07-23 DIAGNOSIS — G89.4 CHRONIC PAIN SYNDROME: ICD-10-CM

## 2024-07-23 DIAGNOSIS — Z74.09 IMPAIRED MOBILITY AND ADLS: Primary | ICD-10-CM

## 2024-07-23 PROCEDURE — 97542 WHEELCHAIR MNGMENT TRAINING: CPT | Mod: GO | Performed by: OCCUPATIONAL THERAPIST

## 2024-07-23 NOTE — PROGRESS NOTES
"                                                                             SEATING AND WHEELED MOBILITY ASSESSMENT    [facility]  Occupational Therapy     Date of service: July 23, 2024    Referring provider: Park Sparks MD   Order Date 5/20/24  Onset Date: 5/20/24    Order Details:  daniel    Funding:[facility]    Others present at visit:  Sibling(s)    Vendor: [employer] from [employer]    Height/Weight: 5'7\" / 170    Medical diagnosis:   Right upper extremity pain, numbness and tingling; S/p C4-C6 ACDF on 1/24/2024  Cervical myelopathy           Past Medical History:   Diagnosis Date    Degenerative arthritis of spine 10/25/2010    Diabetes mellitus (CMS/[facility])      Hypertension      Kidney stones      Major depressive disorder, recurrent, in remission (CMS) 05/03/2018     Overview Note: MAJOR DEPRESSIVE DISORDER, RECURRENT, IN REMISSION #[id number]#    EXT_ID: [id number]    Mixed hyperlipidemia        Patient concerns/goals: power mobility    Living environment:  Adult foster    Living environment barriers:  Ramp(s) present      Current assistance/living environment:  Requires partial assistance    Community Mobility:  Transportation: Car- sister  Community Mobility Requirements: Medical Appointments, Shopping    Current mobility equipment:  4 wheeled walker with seat  Standard cane(s)    Fall Risk Screen:   Has the patient fallen 2 or more times in the last year? Yes      Has the patient fallen and had an injury in the past year? No       25 ft walk: 15.66 second with 4ww with flexed posture    Is the patient a fall risk? Yes, department fall risk interventions implemented    ADL:   Feeding self:  ind  Grooming: prn  UE Dressing:  prn  LE Dressing:  prn  Showering/bathing: prn, shower chair  Toileting/transfer:  prn   Functional Mobility: walker    IADL: staff complete    Sleep Surface/Equipment: hospital bed    Services: 24 hr care via adult foster    Evaluation     Pain " assessment:  Chronic pain  Right shoulder pain  Right knee pain  Worsening joint pain. With his history of lumbar scoliosis, cervical stenosis and chronic pain it can be due nerve impingement, arthritis, muscle spasms.    Cognition:  sister aids in plof    Vision: glasses    Hearing: wnl    Fatigue:  Reported Problems: post exertional malaise reported    Balance:  Unsupported Sitting Balance: Uses UE for Balance  Sitting Balance in Chair: Uses UE for Balance  Standing Balance: Uses UE for Balance    Ambulation:  Level of [place]: Independent  Assistive Device(s): Walker (four wheeled)    Transfers:   Up to moderate assistance     Neuromuscular:    Coordination:  Slowed, no opposition    Tone:   Hypotonic    Sensation:  Sensory Deficits Reported: R arm and radial nerve    Head and Neck:  Head and Neck Position: Flexed  Head Control: Limited  Tone/Movement of Head and Neck: prior fusion    Upper Extremities  UE ROM: Shoulders limited to about 110 degrees flexion with tight end ranges, swelling noted  UE Strength:  4-/5  Dominance: Right    Pelvis  Anterior/Posterior Pelvis Position: Posterior Tilt    Trunk:  Anterior/Posterior Trunk Position: Increased Lumbar Lordosis, Increased Thoracic Kyphosis, Partially Flexible    Lower Extremities:  LE ROM: WFl with R lacking end ranges  LE Strength:  L 4- R 3+  Foot Positioning: Plantarflexed     Impairments:  Fatigue  Muscle atrophy  Coordination  Balance  Pain  Range of motion     Treatment diagnosis:  Impaired mobility  Impaired activities of daily living     Recommendations/Plan of care:  Patient would benefit from interventions to enhance safety and independence.  Occupational therapy intervention for  wheelchair management.    Goals:   Target date:   Patient, family and/or caregiver will verbalize/demonstrate understanding of compensatory methods /equipment to enhance functional independence and safety.    Educational assessment/barriers to learning:  No barriers  noted    Treatment provided this date:   Wheelchair management, 45 minutes   Educated on w.c vs scooter and safe trials of go go scooter in clinic.      Saurabh sultana go Power Operated Vehicle / Scooter -  This device is being requested for this patient with mobility impairments to allow her to be able to complete all of her mobility related activities of daily living in a safe fashion, without risk of injury from falling, and in a reasonable time frame. She demonstrated during the home evaluation that she was able to transfer to/from the requested scooter, operate the tiller steering system, able to maintain postural stability and position while operating the POV, and operate the on/off mechanism and the speed dial appropriately and safely. They are very willing and physically / cognitively able to use the recommended equipment to assist with mobility related activities of daily living and general mobility. There is a mobility limitation that cannot be sufficiently and safely resolved by the use of an appropriately fitted cane or walker and they do not have sufficient upper extremity function to self-propel an optimally-configured manual wheelchair in their home to perform MRADLs during a typical day due to limitations in strength, endurance, range of motion, and coordination. This equipment is reasonable and necessary with reference to accepted standards of medical practice and treatment of this patient's condition and is not being recommended as a convenience item. Without this recommended equipment, she is highly likely to sustain injuries from falls, develop pressure sores or postural compensation, and/or be bed confined, which those costs far exceed the cost of the requested equipment.    This therapist has no financial connection to the equipment being requested or vendor being used.    I have read and concur with the above recommendations.  Physician Printed Name  __________________________________________  Physician Signature  _____________________________________________  Date of Signature ______________________________  Physician Phone  ______________________________      Response to treatment/recommendations: receptive    Goal attainment:  All goals met    Risks and benefits of evaluation/treatment have been explained.  Patient, family and/or caregiver are in agreement with Plan of Care.     Timed Code Treatment Minutes: 45  Total Treatment Time (sum of timed and untimed services): 45    Electronically signed by:  Pam WEAVER/MARK, ATP      Occupational Therapist, Assistive   [phone]      fax: [phone]      [email or web address], [address] [place] [address]  July 23, 2024

## 2024-08-06 ENCOUNTER — APPOINTMENT (OUTPATIENT)
Dept: OPTOMETRY | Facility: CLINIC | Age: 64
End: 2024-08-06
Payer: COMMERCIAL

## 2024-08-06 PROCEDURE — 92340 FIT SPECTACLES MONOFOCAL: CPT | Performed by: OPTOMETRIST

## 2024-08-10 ENCOUNTER — TRANSFERRED RECORDS (OUTPATIENT)
Dept: HEALTH INFORMATION MANAGEMENT | Facility: CLINIC | Age: 64
End: 2024-08-10

## 2024-08-12 ENCOUNTER — LAB REQUISITION (OUTPATIENT)
Dept: LAB | Facility: CLINIC | Age: 64
End: 2024-08-12
Payer: COMMERCIAL

## 2024-08-12 DIAGNOSIS — E11.9 TYPE 2 DIABETES MELLITUS WITHOUT COMPLICATIONS (H): ICD-10-CM

## 2024-08-12 LAB
CHOLEST SERPL-MCNC: 152 MG/DL
FASTING STATUS PATIENT QL REPORTED: ABNORMAL
HDLC SERPL-MCNC: 34 MG/DL
LDLC SERPL CALC-MCNC: 66 MG/DL
NONHDLC SERPL-MCNC: 118 MG/DL
TRIGL SERPL-MCNC: 258 MG/DL

## 2024-08-12 PROCEDURE — 80061 LIPID PANEL: CPT | Mod: ORL | Performed by: INTERNAL MEDICINE

## 2024-08-15 ENCOUNTER — LAB REQUISITION (OUTPATIENT)
Dept: LAB | Facility: CLINIC | Age: 64
End: 2024-08-15
Payer: COMMERCIAL

## 2024-08-15 DIAGNOSIS — Z12.11 ENCOUNTER FOR SCREENING FOR MALIGNANT NEOPLASM OF COLON: ICD-10-CM

## 2024-08-15 PROCEDURE — 82274 ASSAY TEST FOR BLOOD FECAL: CPT | Mod: ORL | Performed by: INTERNAL MEDICINE

## 2024-08-16 LAB — HEMOCCULT STL QL IA: NEGATIVE

## 2024-09-03 ENCOUNTER — MEDICAL CORRESPONDENCE (OUTPATIENT)
Dept: HEALTH INFORMATION MANAGEMENT | Facility: CLINIC | Age: 64
End: 2024-09-03
Payer: COMMERCIAL

## 2024-09-04 ENCOUNTER — TRANSCRIBE ORDERS (OUTPATIENT)
Dept: OTHER | Age: 64
End: 2024-09-04

## 2024-09-04 DIAGNOSIS — G89.29 CHRONIC PAIN OF RIGHT KNEE: Primary | ICD-10-CM

## 2024-09-04 DIAGNOSIS — M25.561 CHRONIC PAIN OF RIGHT KNEE: Primary | ICD-10-CM

## 2024-09-26 ENCOUNTER — LAB REQUISITION (OUTPATIENT)
Dept: LAB | Facility: CLINIC | Age: 64
End: 2024-09-26
Payer: COMMERCIAL

## 2024-09-26 DIAGNOSIS — Z09 ENCOUNTER FOR FOLLOW-UP EXAMINATION AFTER COMPLETED TREATMENT FOR CONDITIONS OTHER THAN MALIGNANT NEOPLASM: ICD-10-CM

## 2024-09-26 LAB
ALBUMIN SERPL BCG-MCNC: 4.2 G/DL (ref 3.5–5.2)
ALP SERPL-CCNC: 73 U/L (ref 40–150)
ALT SERPL W P-5'-P-CCNC: 15 U/L (ref 0–70)
ANION GAP SERPL CALCULATED.3IONS-SCNC: 14 MMOL/L (ref 7–15)
AST SERPL W P-5'-P-CCNC: 18 U/L (ref 0–45)
BILIRUB SERPL-MCNC: 0.4 MG/DL
BUN SERPL-MCNC: 11.7 MG/DL (ref 8–23)
CALCIUM SERPL-MCNC: 9.5 MG/DL (ref 8.8–10.4)
CHLORIDE SERPL-SCNC: 98 MMOL/L (ref 98–107)
CREAT SERPL-MCNC: 1.11 MG/DL (ref 0.67–1.17)
EGFRCR SERPLBLD CKD-EPI 2021: 75 ML/MIN/1.73M2
GLUCOSE SERPL-MCNC: 138 MG/DL (ref 70–99)
HCO3 SERPL-SCNC: 26 MMOL/L (ref 22–29)
POTASSIUM SERPL-SCNC: 3.9 MMOL/L (ref 3.4–5.3)
PROT SERPL-MCNC: 6.9 G/DL (ref 6.4–8.3)
SODIUM SERPL-SCNC: 138 MMOL/L (ref 135–145)

## 2024-09-26 PROCEDURE — 80053 COMPREHEN METABOLIC PANEL: CPT | Mod: ORL | Performed by: NURSE PRACTITIONER

## 2024-10-20 ENCOUNTER — HEALTH MAINTENANCE LETTER (OUTPATIENT)
Age: 64
End: 2024-10-20

## 2024-11-13 NOTE — TELEPHONE ENCOUNTER
Action 11.12.24 bridger   Action Taken Faxed imaging request to [facility].    11.15.24 bridger  Imaging received and resolved to [facility].       DIAGNOSIS: Chronic pain of right knee [M25.561, G89.29] / Dr. Park Sparks at [facility]. imaging done @ [facility]     APPOINTMENT DATE: 11.18.24   NOTES STATUS DETAILS   OFFICE NOTE from referring provider CE 11.4.24, 10.7.24, 9.3.24, 7.15.24 + more  Bridger  [facility]   MEDICATION LIST Internal    MRI  [facility] 8.10.24  MR Knee Right

## 2024-11-18 ENCOUNTER — PRE VISIT (OUTPATIENT)
Dept: ORTHOPEDICS | Facility: CLINIC | Age: 64
End: 2024-11-18

## 2024-11-18 ENCOUNTER — OFFICE VISIT (OUTPATIENT)
Dept: ORTHOPEDICS | Facility: CLINIC | Age: 64
End: 2024-11-18
Payer: COMMERCIAL

## 2024-11-18 DIAGNOSIS — M25.561 CHRONIC PAIN OF RIGHT KNEE: ICD-10-CM

## 2024-11-18 DIAGNOSIS — M17.11 PRIMARY OSTEOARTHRITIS OF RIGHT KNEE: Primary | ICD-10-CM

## 2024-11-18 DIAGNOSIS — G89.29 CHRONIC PAIN OF RIGHT KNEE: ICD-10-CM

## 2024-11-18 DIAGNOSIS — M17.12 PRIMARY OSTEOARTHRITIS OF LEFT KNEE: ICD-10-CM

## 2024-11-18 PROCEDURE — 20610 DRAIN/INJ JOINT/BURSA W/O US: CPT | Mod: RT | Performed by: FAMILY MEDICINE

## 2024-11-18 PROCEDURE — 99203 OFFICE O/P NEW LOW 30 MIN: CPT | Mod: 25 | Performed by: FAMILY MEDICINE

## 2024-11-18 RX ORDER — TRIAMCINOLONE ACETONIDE 40 MG/ML
40 INJECTION, SUSPENSION INTRA-ARTICULAR; INTRAMUSCULAR
Status: COMPLETED | OUTPATIENT
Start: 2024-11-18 | End: 2024-11-18

## 2024-11-18 RX ORDER — LIDOCAINE HYDROCHLORIDE 10 MG/ML
4 INJECTION, SOLUTION EPIDURAL; INFILTRATION; INTRACAUDAL; PERINEURAL
Status: COMPLETED | OUTPATIENT
Start: 2024-11-18 | End: 2024-11-18

## 2024-11-18 RX ADMIN — TRIAMCINOLONE ACETONIDE 40 MG: 40 INJECTION, SUSPENSION INTRA-ARTICULAR; INTRAMUSCULAR at 09:30

## 2024-11-18 RX ADMIN — LIDOCAINE HYDROCHLORIDE 4 ML: 10 INJECTION, SOLUTION EPIDURAL; INFILTRATION; INTRACAUDAL; PERINEURAL at 09:30

## 2024-11-18 NOTE — LETTER
11/18/2024      RE: Ag Wright  [address]  [facility] [address]     Dear Colleague,    Thank you for referring your patient, Ag Wright, to the [facility] [place]. Please see a copy of my visit note below.    Rt knee    Patient reports chronic right knee pain, many years. Denies any injury or change in activity. The pain is located over the posterior aspect of the knee. Pain is worse with weightbearing activities, stationary biking. He does note locking. He's been [place] [facility] [place]. Denies previous right knee injections or surgeries.     Patient was referred to orthopedics 9/9/2024 for chronic right-sided knee pain from his primary provider at [facility] clinic.  Patient was referred to physical therapy at that time as well for knee pain.  Patient used to work out with various forms of exercise but is limited with exercise and long periods of walking due to right-sided weightbearing knee pain.    MRI of the right knee 8/12/2024 consistent with tricompartmental degenerative arthritis most prominent in the medial joint space, complex free edge tear of posterior horn of medial meniscus, 5 mm intra-articular loose body in the lateral compartments, no discrete ACL or PCL tear.        PMH: HTN, T2DM, major depressive disorder, asthma, sleep apnea, cervical stenosis s/p ADCF on 1/2024, lumbar pain 2/2 scoliosis, chronic opioid use, history of PE           Imaging studies below reviewed by me:  EXAM: MRI of the RIGHT KNEE, without contrast  8/12/2024 RAYUS    CLINICAL INFORMATION: Male, 63 years old, with 1.5 year history of right knee pain.    INDICATION: Evaluate knee pain.    PRIOR SURGERY: None reported.    PLAIN FILMS: None available.    COMPARISONS: No prior MRIs available.    FINDINGS:    Knee joint:    Effusion: Moderate right knee effusion, with synovitis.    Popliteal cyst: None.    Loose bodies: A 5 x 7 mm intra-articular body is  present in the posterior aspect of the lateral compartment (sagittal PD series 9 image 22).    Subcutaneous and extra-articular soft tissues: Unremarkable.      Ligaments:    ACL: Moderate marked thickening and abnormal intrasubstance signal throughout the ACL, without ACL tear.    PCL: Mild-moderate thickening and abnormal signal throughout the proximal PCL, without PCL tear.    MCL: Intact MCL superficial and deep layers, without injury.    LCL: Intact LCL, without injury.    Posterolateral corner:    No posterolateral corner soft tissue injury. Popliteus, biceps femoris, iliotibial band, popliteofibular ligament and lateral gastrocnemius are intact.    Posteromedial corner:    No posteromedial corner soft tissue injury. Semimembranosus, pes anserine tendons and posterior oblique ligament are without injury, tendinopathy or bursitis.    Extensor mechanism:    Patellar tendon: Mild proximal patellar tendinopathy, without tear.    Quadriceps tendon: Intact, without tendinopathy.    Retinacula: Medial and lateral retinacula are intact.    Fat pads: Unremarkable infrapatellar Hoffa's, quadriceps and prefemoral fat pads.    Medial compartment:    Medial meniscus: Complex apical free edge and superior surface tearing of the posterior horn and body of the medial meniscus measures 3.8 cm (sagittal PDFS series 10 images 914 and coronal PD series 11 images 12-17). Meniscal extrusion measures 7 mm.    Medial femoral condyle & tibial plateau: Generalized grade III/IV chondromalacia of the medial compartment, with moderate marginal osteophytosis and mild reactive osseous changes.    Lateral compartment:    Lateral meniscus: Intrasubstance degeneration and fraying is present throughout the anterior root of the lateral meniscus, without discrete lateral meniscal tear.    Lateral femoral condyle & tibial plateau: Generalized grade II chondromalacia of the lateral compartment, with mild marginal osteophytosis.    Patellofemoral  joint:    Patella & trochlea: Generalized grade III chondromalacia of the patellofemoral compartment, with moderate-marked marginal osteophytosis.    Proximal tibiofibular joint:    Unremarkable, without evidence of ligament sprain injury, joint effusion or adjacent marrow edema.    Bones:    No stress/occult fractures or other marrow edema/pathology.    IMPRESSION:    1. Tricompartmental osteoarthritis of the right knee:    -Advanced osteoarthritis of the medial compartment.    -Moderate-advanced osteoarthritis of the patellofemoral compartment.    -Mild osteoarthritis of the lateral compartment.    2. Complex degenerative tearing of the posterior horn and body of the medial meniscus measuring 3.8 cm, with 7 mm of meniscal extrusion.    3. Marked mucinous degeneration of the ACL. There is mild/moderate mucinous dilatation of the PCL. No cruciate or collateral ligament tear.    4. Moderate knee joint effusion, with synovitis and a 5 x 7 mm ossific intra-articular body within the posterior aspect of the lateral compartment.    5. Mild proximal patellar tendinopathy, without tear.    6. No lateral meniscal tear.            MR SPINE LUMBAR W/O CONTRAST [facility] 10/9/2023    Impression    Impression:    1.Progressed advanced lumbar spondylosis detailed as above. There is moderate central canal stenosis at L3-4. Severe neuroforaminal narrowing at left T12-L1 and L1-2 neuroforamen, right L2-3 and L3-4 neuroforamen, as well as bilateral L5-S1 neuroforamen.  2.Cholelithiasis.        PMH:  Past Medical History:   Diagnosis Date     Arthritis      Cervicalgia 5/12/2009     Chronic back pain      Depression      Gastro-oesophageal reflux disease      Hypertension      Kidney stones      Sleep apnea      Type 2 diabetes mellitus without complications (H)    Calcium nephrolithiasis 2/18/2020   Class 2 obesity in adult 01/14/2010   Essential (primary) hypertension 10/02/2006   Gout 05/21/2009   Hydrocele and spermatocele 01/29/2013    LUMBAGO/ LOW BACK PAIN 05/04/2007   s/p L3-5 surgery 2014   Major depressive disorder, recurrent, in remission 05/03/2018   Male erectile dysfunction 02/10/2017   Sleep apnea 10/04/2006   Type 2 diabetes mellitus 10/04/2006     Active problem list:  Patient Active Problem List   Diagnosis     Chronic pain syndrome     Chronic low back pain     Cervicalgia     Degenerative arthritis of spine     Central and Lateral spinal stenosis     Overweight     Physical deconditioning     Mixed anxiety depressive disorder     Knee pain     Low back pain     Edema     Nasal septal abscess       FH:  Family History   Problem Relation Age of Onset     Heart Disease Mother         disease     Hypertension Mother      Other - See Comments Mother         migraines     Cancer Father         leukiemia     Depression Other      Glaucoma No family hx of      Macular Degeneration No family hx of        SH:  Social History     Socioeconomic History     Marital status:      Spouse name: Not on file     Number of children: Not on file     Years of education: Not on file     Highest education level: Not on file   Occupational History     Not on file   Tobacco Use     Smoking status: Never     Smokeless tobacco: Never   Substance and Sexual Activity     Alcohol use: No     Drug use: No     Sexual activity: Not on file   Other Topics Concern     Not on file   Social History Narrative     Not on file     Social Drivers of Health     Financial Resource Strain: Patient Unable To Answer (9/13/2024)    Received from [facility]    Overall Financial Resource Strain (CARDIA)      Difficulty of Paying Living Expenses: Patient unable to answer   Recent Concern: Financial Resource Strain - At Risk (8/7/2024)    Received from [facility]    Financial Resource Strain      Financial Resource Strain: 2   Food Insecurity: Patient Unable To Answer (9/13/2024)    Received from [facility]    Hunger Vital Sign      Worried About Running Out of  Food in the Last Year: Patient unable to answer      Ran Out of Food in the Last Year: Patient unable to answer   Recent Concern: Food Insecurity - At Risk (2024)    Received from [facility]    Food Insecurity      Food: 2   Transportation Needs: Patient Unable To Answer (2024)    Received from [facility]    PRAPARE - Transportation      Lack of Transportation (Medical): Patient unable to answer      Lack of Transportation (Non-Medical): Patient unable to answer   Physical Activity: Not on File (2019)    [facility]    Physical Activity      Physical Activity: 0   Stress: Not on File (2023)    Received from [facility]    Stress      Stress: 0   Recent Concern: Stress - At Risk (2023)    [facility]    Stress      Stress: 2   Social Connections: Not on File (2023)    Received from [facility]    Social Connections      Connectedness: 0   Recent Concern: Social Connections - At Risk (2023)    Received from [facility]    Social Connections      Social Connections and Isolation: 2   Interpersonal Safety: Patient Unable To Answer (2024)    Received from [facility]    Humiliation, Afraid, Rape, and Kick questionnaire      Fear of Current or Ex-Partner: Patient unable to answer      Emotionally Abused: Patient unable to answer      Physically Abused: Patient unable to answer      Sexually Abused: Patient unable to answer   Housing Stability: Unknown (2024)    Received from [facility]    Housing Stability      What is your housing situation today?: 5 - Patient unable to answer   Recent Concern: Housing Stability - At Risk (2024)    Received from [facility]    Housing Stability      Housin       MEDS:  See EMR, reviewed  diclofenac sodium (VOLTAREN) 1 % gel   Indications: Chronic bilateral low back pain without sciatica Apply 2 g topically 2 (two) times daily 100 g  2   2024     oxyCODONE-acetaminophen (PERCOCET)  mg per tablet   Indications:  Cervical stenosis of spinal canal Take 1 Tablet by mouth every 4 (four) hours as needed for pain 180 Tablet      09/09/2024 10/07/2024   gabapentin (NEURONTIN) 400 mg capsule   Indications: Cervical stenosis of spinal canal, Chronic bilateral low back pain without sciatica Take 1 Capsule by mouth 3 (three) times daily 270 Capsule      09/09/2024 11/04/2024   gabapentin (NEURONTIN) 100 mg capsule   Indications: Cervical stenosis of spinal canal, Chronic bilateral low back pain without sciatica Take 1 Capsule by mouth 3 (three) times daily for 90 days 270 Capsule      09/09/2024 11/04/2024   buprenorphine-naloxone (SUBOXONE) 8-2 mg SL film   Indications: Cervical stenosis of spinal canal, Scoliosis of thoracolumbar spine, unspecified scoliosis type, Chronic bilateral low back pain without sciatica         buprenorphine-naloxone (SUBOXONE) 8-2 mg SL film Place 1 Strip under the tongue 3 (three) times a day 90 Each 1   cyclobenzaprine (FLEXERIL) 10 mg tablet TAKE 1/2 TABLET TO 1 TABLET BY MOUTH THREE TIMES A DAY AS NEEDED FOR MUSCLE SPASMS 30 Tablet 3   diclofenac sodium (VOLTAREN) 1 % gel Apply 2 g topically 2 (two) times daily 100 g 2   gabapentin (NEURONTIN) 100 mg capsule Take 1 Capsule by mouth 3 (three) times daily for 90 days 270 Capsule 0   gabapentin (NEURONTIN) 400 mg capsule Take 1 Capsule by mouth 3 (three) times daily 270 Capsule 0   oxyCODONE-acetaminophen (PERCOCET)  mg per tablet Take 1 Tablet by mouth every 4 (four) hours as needed for pain 180 Tablet 0   carvediloL (COREG) 25 mg tablet TAKE 1 TABLET BY MOUTH TWICE A DAY WITH MEALS 90 Tablet 3   cholecalciferol (VITAMIN D-3) 125 mcg (5,000 unit) capsule TAKE 1 CAPSULE BY MOUTH DAILY 28 Capsule 3   metFORMIN XR (GLUCOPHAGE-XR) 500 mg 24 hr tablet TAKE 1 TABLET BY MOUTH TWICE A DAY 90 Tablet 3   ELIQUIS 5 mg tab TAKE 1 TABLET BY MOUTH TWICE A DAY 56 Tablet 1   ketotifen (ZADITOR) 0.025 % (0.035 %) ophthalmic solution INSTILL 1 DROP INTO AFFECTED  EYE(S) BY OPHTHALMIC ROUTE 2 TIMES PER DAY AS NEEDED FOR EYE ALLERGIES 5 mL 0   selenium sulfide (SELSUN BLUE) 1 % topical suspension Apply topically once daily as needed for other reason (dry flaky scalp) 240 mL 1   Eucerin crea Apply 1 application. topically as needed (dry skin on feet) 454 g 1   amLODIPine (NORVASC) 10 mg tablet Take 1 Tablet by mouth once daily 30 Tablet 5   hydroCHLOROthiazide 12.5 mg tablet Take 1 Tablet by mouth once daily 30 Tablet 5   lisinopriL 20 mg tablet Take 1 Tablet by mouth once daily 30 Tablet 5   famotidine (PEPCID) 20 mg tablet TAKE 1 TABLET BY MOUTH TWICE A  Tablet 3   omeprazole (PRILOSEC) 20 mg DR capsule Take 1 Capsule by mouth 2 (two) times daily before a meal 60 Capsule 0   atorvastatin (LIPITOR) 80 mg tablet TAKE 1/2 TABLET (40MG) BY MOUTH DAILY 90 Tablet 3   melatonin 3 mg cap Take 1 Capsule by mouth nightly at bedtime as needed (for sleep) 90 Capsule 3   calcium carbonate (TUMS ULTRA) 400 mg calcium (1,000 mg) chew Chew and swallow 1 Tablet by mouth 2 (two) times daily as needed (indigestion or gastritis) 100 Tablet 3   polyethylene glycol, PEG, 3350 (GLYCOLAX) 17 gram/dose powder Take 17 g by mouth   sennosides (SENOKOT) 8.6 mg tablet Take 8.6 mg by mouth   escitalopram (LEXAPRO) 20 mg tablet Take 1 Tablet by mouth nightly at bedtime 90 Tablet 0   cetirizine (ZYRTEC) 10 mg tablet Take 1 Tablet by mouth once daily as needed for allergies 90 Tablet 0   lactulose (CHRONULAC) 10 gram/15 mL solution Take 15 mL by mouth once daily 473 mL 0   testosterone (ANDROGEL) 1 % (25 mg/2.5gram) Gel Place 50 mg onto the skin once daily Apply to clean, dry, intact skin of shoulders, upper arms or abdomen. Do not apply to genitals. 60 Packet 3   naloxone 0.4 mg/mL injection solution Inject 1 mL into the muscle once for 1 dose May repeat in 2-3 min if needed 2 mL 0   bisacodyL (DULCOLAX) 5 mg EC tablet Take 2 Tablets by mouth once daily as needed for constipation 30 Tablet 3   saliva  substitute combo no.7 (DRY MOUTH) mwsh 5 mL by mucous membrane route 2 (two) times daily as needed (dry mouth) Ok to sub brand per insurance. 474 mL 3   FLOWFLEX COVID-19 AG HOME TEST kit Use as directed.   selenium sulfide 2.25 % shampoo Apply to scalp by topical route 2 times per week 180 mL 0   ibuprofen 600 mg tablet TAKE ONE TABLET BY MOUTH FOUR TIMES DAILY AS NEEDED 60 Tab 0   blood pressure test kit-large Check q week 1 Kit 0   colchicine (COLCRYS) 0.6 mg tablet Take 1 Tab by mouth 2 (two) times daily as needed for joint pain For Gout Flare   sennosides-docusate sodium (PERICOLACE) 8.6-50 mg per tablet Take 1 Tab by mouth 2 (two) times daily as needed for constipation         ALL:  See EMR, reviewed  Allergen Reactions   Milnacipran Palpitations   LegacyRecord#[id number]   Codeine   Amitriptyline   vivid dreams. LegacyRecord#41   Aspirin   LegacyRecord#37   Azithromycin Nausea and Vomiting   Erythromycin Nausea and Vomiting   Gabapentin   vivid dreams. LegacyRecord#40   Gemfibrozil   LegacyRecord#[id number]   Iodinated Contrast Media Unknown   1/4/15: pretreated with only IV benadryl and did fine.   Labetalol   Severe headache.. LegacyRecord#[id number]   Prochlorperazine   LegacyRecord#36   Rofecoxib   Hypertention. LegacyRecord#38   Sulfa (Sulfonamide Antibiotics) Nausea and Vomiting   Sulfamethoxazole-Trimethoprim   LegacyRecord#39     REVIEW OF SYSTEMS:  CONSTITUTIONAL:NEGATIVE for fever, chills, change in weight  INTEGUMENTARY/SKIN: NEGATIVE for worrisome rashes, moles or lesions  EYES: NEGATIVE for vision changes or irritation  ENT/MOUTH: NEGATIVE for ear, mouth and throat problems  RESP:NEGATIVE for significant cough or SOB  BREAST: NEGATIVE for masses, tenderness or discharge  CV: NEGATIVE for chest pain, palpitations or peripheral edema  GI: NEGATIVE for nausea, abdominal pain, heartburn, or change in bowel habits  :NEGATIVE for frequency, dysuria, or hematuria  :NEGATIVE for frequency, dysuria, or hematuria  NEURO:  NEGATIVE for weakness, dizziness or paresthesias  ENDOCRINE: NEGATIVE for temperature intolerance, skin/hair changes  HEME/ALLERGY/IMMUNE: NEGATIVE for bleeding problems  PSYCHIATRIC: NEGATIVE for changes in mood or affect      Objective: He has bilateral knee varus alignment, which he said is his natural alignment that he has had ever since being an adult.  He does not feel that his knees have become more varus over time.  The right knee reveals no effusion.  I can flex and extend it fully.  Tender over the medial joint line, nontender over the lateral joint line, no swelling the popliteal space or tenderness in the calf.  Anterior posterior drawer is negative.  Nontender over the patellar tendon or pes anserine bursa.  Wnl conversation and affect.  Overlying skin is intact.    I personally viewed the patient his MRI results of the knee in detail.    A: Right-sided knee tricompartmental DJD.  History of lumbar scoliosis and cervical spine surgery    Plan: We discussed conservative care including over-the-counter pain medicines and their side effects, pull-up knee sleeve, medial  brace, cortisone injection, Synvisc injection.  I did these options he would like to try cortisone shot.  After informed consent about bleeding, infection, steroid flare after prepping with surgical scrub he was injected in the right knee from a lateral approach in the seated position with 1 cc of Kenalog 40 and 5 cc of 1% lidocaine.  The medicine went in easily, and he left the clinic ambulatory.  They requested to see physical therapy at [facility], rather than [facility] where he was previously sent by his primary provider.  He would like to see physical therapy for bilateral knee DJD.  We discussed that he would benefit, at his next visit with this clinic, with standing x-rays of the bilateral knees.        Large Joint Injection: R knee joint    Date/Time: 11/18/2024 9:30 AM    Performed by: Angel  Cesar Espitia MD  Authorized by: Cesar Ortiz MD    Indications:  Pain  Needle Size:  25 G  Guidance: landmark guided    Approach:  Superolateral  Location:  Knee      Medications:  40 mg triamcinolone 40 MG/ML; 4 mL lidocaine (PF) 1 %  Outcome:  Tolerated well, no immediate complications  Procedure discussed: discussed risks, benefits, and alternatives    Consent Given by:  Patient  Timeout: timeout called immediately prior to procedure    Prep: patient was prepped and draped in usual sterile fashion                      Again, thank you for allowing me to participate in the care of your patient.      Sincerely,    Cesar Ortiz MD

## 2024-11-18 NOTE — PATIENT INSTRUCTIONS
[facility] Outpatient Physical Therapy Locations    To schedule an appointment please call our scheduling department at [phone]      [facility] [facility] [place] [place]

## 2024-11-18 NOTE — NURSING NOTE
[address] [address]  Dept: [phone]  ______________________________________________________________________________    Patient: Ag Wright   : 1960   MRN: [id number]   2024    INVASIVE PROCEDURE SAFETY CHECKLIST    Date: 2024   Procedure: Right knee cortisone injection  Patient Name: Ag Wright  MRN: [id number]  YOB: 1960    Action: Complete sections as appropriate. Any discrepancy results in a HARD COPY until resolved.     PRE PROCEDURE:  Patient ID verified with 2 identifiers (name and  or MRN): Yes  Procedure and site verified with patient/designee (when able): Yes  Accurate consent documentation in medical record: Yes  H&P (or appropriate assessment) documented in medical record: Yes  H&P must be up to 20 days prior to procedure and updates within 24 hours of procedure as applicable: NA  Relevant diagnostic and radiology test results appropriately labeled and displayed as applicable: Yes  Procedure site(s) marked with provider initials: NA    TIMEOUT:  Time-Out performed immediately prior to starting procedure, including verbal and active participation of all team members addressing the following:Yes  * Correct patient identify  * Confirmed that the correct side and site are marked  * An accurate procedure consent form  * Agreement on the procedure to be done  * Correct patient position  * Relevant images and results are properly labeled and appropriately displayed  * The need to administer antibiotics or fluids for irrigation purposes during the procedure as applicable   * Safety precautions based on patient history or medication use    DURING PROCEDURE: Verification of correct person, site, and procedures any time the responsibility for care of the patient is transferred to another member of the care team.       Prior to injection, verified patient identity using patient's name  and date of birth.  Due to injection administration, patient instructed to remain in clinic for 15 minutes  afterwards, and to report any adverse reaction to me immediately.    Joint injection was performed.      Drug Amount Wasted:  Yes: 1 mg/ml   Vial/Syringe: Single dose vial  Expiration Date:  04/2028      Louise Rubio, ATC  November 18, 2024

## 2025-01-06 ENCOUNTER — LAB REQUISITION (OUTPATIENT)
Dept: LAB | Facility: CLINIC | Age: 65
End: 2025-01-06
Payer: COMMERCIAL

## 2025-01-06 ENCOUNTER — ANCILLARY PROCEDURE (OUTPATIENT)
Dept: ULTRASOUND IMAGING | Facility: CLINIC | Age: 65
End: 2025-01-06
Attending: INTERNAL MEDICINE
Payer: COMMERCIAL

## 2025-01-06 DIAGNOSIS — M79.89 RIGHT LEG SWELLING: ICD-10-CM

## 2025-01-06 DIAGNOSIS — R63.5 ABNORMAL WEIGHT GAIN: ICD-10-CM

## 2025-01-06 DIAGNOSIS — Z51.81 ENCOUNTER FOR THERAPEUTIC DRUG LEVEL MONITORING: ICD-10-CM

## 2025-01-06 DIAGNOSIS — M79.89 OTHER SPECIFIED SOFT TISSUE DISORDERS: ICD-10-CM

## 2025-01-06 LAB
ANION GAP SERPL CALCULATED.3IONS-SCNC: 14 MMOL/L (ref 7–15)
BUN SERPL-MCNC: 22.4 MG/DL (ref 8–23)
CALCIUM SERPL-MCNC: 9.5 MG/DL (ref 8.8–10.4)
CHLORIDE SERPL-SCNC: 95 MMOL/L (ref 98–107)
CREAT SERPL-MCNC: 1.27 MG/DL (ref 0.67–1.17)
D DIMER PPP FEU-MCNC: 0.75 UG/ML FEU (ref 0–0.5)
EGFRCR SERPLBLD CKD-EPI 2021: 63 ML/MIN/1.73M2
GLUCOSE SERPL-MCNC: 146 MG/DL (ref 70–99)
HCO3 SERPL-SCNC: 26 MMOL/L (ref 22–29)
POTASSIUM SERPL-SCNC: 4.3 MMOL/L (ref 3.4–5.3)
SODIUM SERPL-SCNC: 135 MMOL/L (ref 135–145)
TSH SERPL DL<=0.005 MIU/L-ACNC: 1.34 UIU/ML (ref 0.3–4.2)

## 2025-01-06 PROCEDURE — 84443 ASSAY THYROID STIM HORMONE: CPT | Mod: ORL | Performed by: INTERNAL MEDICINE

## 2025-01-06 PROCEDURE — 85379 FIBRIN DEGRADATION QUANT: CPT | Mod: ORL | Performed by: INTERNAL MEDICINE

## 2025-01-06 PROCEDURE — 93971 EXTREMITY STUDY: CPT | Mod: RT | Performed by: STUDENT IN AN ORGANIZED HEALTH CARE EDUCATION/TRAINING PROGRAM

## 2025-01-06 PROCEDURE — 80048 BASIC METABOLIC PNL TOTAL CA: CPT | Mod: ORL | Performed by: INTERNAL MEDICINE

## 2025-01-14 DIAGNOSIS — E11.3219 TYPE 2 DIABETES MELLITUS WITH MILD NONPROLIFERATIVE RETINOPATHY AND MACULAR EDEMA, WITHOUT LONG-TERM CURRENT USE OF INSULIN, UNSPECIFIED LATERALITY (H): Primary | ICD-10-CM

## 2025-01-26 ENCOUNTER — HEALTH MAINTENANCE LETTER (OUTPATIENT)
Age: 65
End: 2025-01-26

## 2025-02-10 ENCOUNTER — HOSPITAL ENCOUNTER (OUTPATIENT)
Dept: CT IMAGING | Facility: CLINIC | Age: 65
Discharge: HOME | End: 2025-02-10
Attending: INTERNAL MEDICINE | Admitting: INTERNAL MEDICINE
Payer: COMMERCIAL

## 2025-02-10 DIAGNOSIS — M79.89 RIGHT LEG SWELLING: ICD-10-CM

## 2025-02-10 LAB
CREAT BLD-MCNC: 1.2 MG/DL (ref 0.7–1.3)
EGFRCR SERPLBLD CKD-EPI 2021: >60 ML/MIN/1.73M2

## 2025-02-10 PROCEDURE — 250N000011 HC RX IP 250 OP 636: Performed by: INTERNAL MEDICINE

## 2025-02-10 PROCEDURE — 82565 ASSAY OF CREATININE: CPT

## 2025-02-10 PROCEDURE — 250N000009 HC RX 250: Performed by: INTERNAL MEDICINE

## 2025-02-10 PROCEDURE — 74174 CTA ABD&PLVS W/CONTRAST: CPT

## 2025-02-10 RX ORDER — IOPAMIDOL 755 MG/ML
72 INJECTION, SOLUTION INTRAVASCULAR ONCE
Status: COMPLETED | OUTPATIENT
Start: 2025-02-10 | End: 2025-02-10

## 2025-02-10 RX ADMIN — SODIUM CHLORIDE 80 ML: 9 INJECTION, SOLUTION INTRAVENOUS at 07:56

## 2025-02-10 RX ADMIN — IOPAMIDOL 72 ML: 755 INJECTION, SOLUTION INTRAVENOUS at 07:56

## 2025-03-03 ENCOUNTER — LAB REQUISITION (OUTPATIENT)
Dept: LAB | Facility: CLINIC | Age: 65
End: 2025-03-03
Payer: COMMERCIAL

## 2025-03-03 DIAGNOSIS — I15.9 SECONDARY HYPERTENSION, UNSPECIFIED: ICD-10-CM

## 2025-03-03 DIAGNOSIS — R60.9 EDEMA, UNSPECIFIED: ICD-10-CM

## 2025-03-03 LAB
ALBUMIN SERPL BCG-MCNC: 4.5 G/DL (ref 3.5–5.2)
ALP SERPL-CCNC: 133 U/L (ref 40–150)
ALT SERPL W P-5'-P-CCNC: 37 U/L (ref 0–70)
ANION GAP SERPL CALCULATED.3IONS-SCNC: 14 MMOL/L (ref 7–15)
AST SERPL W P-5'-P-CCNC: 33 U/L (ref 0–45)
BILIRUB SERPL-MCNC: 0.7 MG/DL
BUN SERPL-MCNC: 20.8 MG/DL (ref 8–23)
CALCIUM SERPL-MCNC: 9.4 MG/DL (ref 8.8–10.4)
CHLORIDE SERPL-SCNC: 97 MMOL/L (ref 98–107)
CREAT SERPL-MCNC: 1.16 MG/DL (ref 0.67–1.17)
D DIMER PPP FEU-MCNC: 1.05 UG/ML FEU (ref 0–0.5)
EGFRCR SERPLBLD CKD-EPI 2021: 71 ML/MIN/1.73M2
GLUCOSE SERPL-MCNC: 180 MG/DL (ref 70–99)
HCO3 SERPL-SCNC: 28 MMOL/L (ref 22–29)
POTASSIUM SERPL-SCNC: 3.7 MMOL/L (ref 3.4–5.3)
PROT SERPL-MCNC: 7.3 G/DL (ref 6.4–8.3)
SODIUM SERPL-SCNC: 139 MMOL/L (ref 135–145)

## 2025-03-03 PROCEDURE — 80053 COMPREHEN METABOLIC PANEL: CPT | Mod: ORL | Performed by: INTERNAL MEDICINE

## 2025-03-03 PROCEDURE — 85379 FIBRIN DEGRADATION QUANT: CPT | Mod: ORL | Performed by: INTERNAL MEDICINE

## 2025-03-06 ENCOUNTER — ANCILLARY PROCEDURE (OUTPATIENT)
Dept: CT IMAGING | Facility: CLINIC | Age: 65
End: 2025-03-06
Attending: INTERNAL MEDICINE
Payer: COMMERCIAL

## 2025-03-06 DIAGNOSIS — R60.9 EDEMA: ICD-10-CM

## 2025-03-06 PROCEDURE — 250N000011 HC RX IP 250 OP 636: Performed by: INTERNAL MEDICINE

## 2025-03-06 PROCEDURE — 71275 CT ANGIOGRAPHY CHEST: CPT

## 2025-03-06 PROCEDURE — 250N000009 HC RX 250: Performed by: INTERNAL MEDICINE

## 2025-03-06 RX ORDER — IOPAMIDOL 755 MG/ML
94 INJECTION, SOLUTION INTRAVASCULAR ONCE
Status: COMPLETED | OUTPATIENT
Start: 2025-03-06 | End: 2025-03-06

## 2025-03-06 RX ADMIN — IOPAMIDOL 94 ML: 755 INJECTION, SOLUTION INTRAVENOUS at 08:55

## 2025-03-06 RX ADMIN — SODIUM CHLORIDE 89 ML: 9 INJECTION, SOLUTION INTRAVENOUS at 08:55

## 2025-03-12 ENCOUNTER — ANCILLARY PROCEDURE (OUTPATIENT)
Dept: CARDIOLOGY | Facility: CLINIC | Age: 65
End: 2025-03-12
Attending: INTERNAL MEDICINE
Payer: COMMERCIAL

## 2025-03-12 DIAGNOSIS — R60.9 2+ PITTING EDEMA: Primary | ICD-10-CM

## 2025-03-12 LAB — LVEF ECHO: NORMAL

## 2025-03-12 PROCEDURE — 93306 TTE W/DOPPLER COMPLETE: CPT | Performed by: INTERNAL MEDICINE

## 2025-03-12 RX ADMIN — Medication 6 ML: at 11:08

## 2025-03-24 ENCOUNTER — TRANSCRIBE ORDERS (OUTPATIENT)
Dept: OTHER | Age: 65
End: 2025-03-24

## 2025-03-24 ENCOUNTER — HOSPITAL ENCOUNTER (EMERGENCY)
Facility: CLINIC | Age: 65
Discharge: HOME | End: 2025-03-25
Attending: EMERGENCY MEDICINE | Admitting: EMERGENCY MEDICINE
Payer: COMMERCIAL

## 2025-03-24 VITALS
OXYGEN SATURATION: 95 % | TEMPERATURE: 98.4 F | SYSTOLIC BLOOD PRESSURE: 158 MMHG | DIASTOLIC BLOOD PRESSURE: 83 MMHG | HEART RATE: 102 BPM | RESPIRATION RATE: 18 BRPM

## 2025-03-24 DIAGNOSIS — M54.50 ACUTE EXACERBATION OF CHRONIC LOW BACK PAIN: ICD-10-CM

## 2025-03-24 DIAGNOSIS — G89.29 ACUTE EXACERBATION OF CHRONIC LOW BACK PAIN: ICD-10-CM

## 2025-03-24 DIAGNOSIS — G47.33 OSA (OBSTRUCTIVE SLEEP APNEA): Primary | ICD-10-CM

## 2025-03-24 PROCEDURE — 99283 EMERGENCY DEPT VISIT LOW MDM: CPT

## 2025-03-24 RX ORDER — OXYCODONE AND ACETAMINOPHEN 10; 325 MG/1; MG/1
1 TABLET ORAL ONCE
Status: DISCONTINUED | OUTPATIENT
Start: 2025-03-24 | End: 2025-03-25

## 2025-03-24 ASSESSMENT — COLUMBIA-SUICIDE SEVERITY RATING SCALE - C-SSRS
6. HAVE YOU EVER DONE ANYTHING, STARTED TO DO ANYTHING, OR PREPARED TO DO ANYTHING TO END YOUR LIFE?: NO
1. IN THE PAST MONTH, HAVE YOU WISHED YOU WERE DEAD OR WISHED YOU COULD GO TO SLEEP AND NOT WAKE UP?: NO
2. HAVE YOU ACTUALLY HAD ANY THOUGHTS OF KILLING YOURSELF IN THE PAST MONTH?: NO

## 2025-03-24 ASSESSMENT — ACTIVITIES OF DAILY LIVING (ADL): ADLS_ACUITY_SCORE: 41

## 2025-03-25 PROCEDURE — 250N000013 HC RX MED GY IP 250 OP 250 PS 637: Performed by: EMERGENCY MEDICINE

## 2025-03-25 RX ORDER — OXYCODONE AND ACETAMINOPHEN 5; 325 MG/1; MG/1
2 TABLET ORAL ONCE
Status: COMPLETED | OUTPATIENT
Start: 2025-03-25 | End: 2025-03-25

## 2025-03-25 RX ADMIN — OXYCODONE HYDROCHLORIDE AND ACETAMINOPHEN 2 TABLET: 5; 325 TABLET ORAL at 00:09

## 2025-03-25 NOTE — ED TRIAGE NOTES
Pt w/ chronic pain, was supposed to get a oxycodone 10 mg refill from the pharmacy today but they brought the wrong medication. Chronic back and R leg pain, takes oxy Q4 hours. Pharmacy is supposed to bring prescription to the house tomorrow morning at 0900.      Triage Assessment (Adult)       Row Name 03/24/25 4084          Triage Assessment    Airway WDL WDL        Respiratory WDL    Respiratory WDL WDL        Skin Circulation/Temperature WDL    Skin Circulation/Temperature WDL WDL        Cardiac WDL    Cardiac WDL WDL        Peripheral/Neurovascular WDL    Peripheral Neurovascular WDL WDL        Cognitive/Neuro/Behavioral WDL    Cognitive/Neuro/Behavioral WDL WDL

## 2025-03-25 NOTE — ED PROVIDER NOTES
Emergency Department Note      History of Present Illness     Chief Complaint   Medication Refill and Back Pain    HPI   Ag Wright is a 64 year old male with history of chronic low back pain, hypertension, type 2 diabetes, and PE on Eliquis who presents to the ED for evaluation of medication refill and back pain. The patient reports that he normally takes Percocet for his chronic back pain, however the pharmacy messed up his prescription and he won't get it delivered until tomorrow morning. His last dose of the medication at 1000 this morning, and he normally takes it every 4 hours.    Independent Historian   None    Review of External Notes   Reviewed prescription monitoring and patient prescribed oxycodone-acetaminophen 10 mg-325 mg up to 6 times per day. last refilled in February    Past Medical History     Medical History and Problem List   Acute kidney injury  Acute on chronic heart failure with preserved ejection fraction  Anxiety   Arthritis  Asthma   Bilateral lower extremity edema   Calcium nephrolithiasis   Central and lateral spinal stenosis   Cervicalgia  Chronic back pain  Chronic pain syndrome  CKD  Degenerative arthritis of spine   Delirium tremens   Depression  GERD  Gout   Hydrocele and spermatocele   Hyperlipidemia   Hypertension  Hypoxia   Insomnia   Irritable bowel syndrome   Kidney stones  Lumbosacral radiculitis   Opioid use disorder  PE  Peripheral neuropathy   Pneumonia   Reactive airway dysfunction syndrome   Scoliosis   Sleep apnea  Tendonitis   Type 2 diabetes  Vitamin D deficiency     Medications   Albuterol  Atenolol  Ceftin   Colcrys   Coreg   Eliquis   Flexeril   Hydrochlorothiazide   Januvia   Lactulose   Lasix   Lexapro  Lipitor   Medrol   Metformin  Minoxidil  Neurontin   Norvasc   Pepcid   Percocet   Prilosec   Protonix   Roxicodone   Suboxone   Zestril   Zofran     Surgical History   Back surgery x3  C4-C5 and C5-C6 spinal fusion anterior cervical discectomy   Endoscopic  endonasal surgery  GI EGD diagnostic x2  GI EGD with biopsy  GI EGD with control of bleed   Hydrocelectomy   Kidney stone removal  L3-5 laminectomy with decompression   Tonsillectomy     Physical Exam     Patient Vitals for the past 24 hrs:   BP Temp Temp src Pulse Resp SpO2   03/24/25 2239 (!) 158/83 98.4  F (36.9  C) Temporal 102 18 95 %     Physical Exam  General: Sitting up in bed  Eyes:  The pupils are equal and round    Conjunctivae and sclerae are normal  ENT:    Atraumatic face  Neck:  Normal range of motion  CV:  Tachycardic rate,  regular rhythm     Skin warm and well perfused   Resp:  Non labored breathing on room air    No tachypnea    No cough heard    Lungs clear bilaterally  MS:  Mild tenderness on lower back  Skin:  No rash or acute skin lesions noted  Neuro:   Awake, alert.      Speech is normal and fluent.    Face is symmetric.     Moves all extremities equally    Walks with walker and hunched over  Psych: Normal affect.  Appropriate interactions.     Diagnostics     Lab Results   None     Imaging   None     EKG   None     Independent Interpretation   None    ED Course      Medications Administered   Medications   oxyCODONE-acetaminophen (PERCOCET) 5-325 MG per tablet 2 tablet (2 tablets Oral $Given 3/25/25 0009)     Procedures   None      Discussion of Management   None    ED Course   ED Course as of 03/25/25 0029   Mon Mar 24, 2025   8383 I obtained history and examined the patient as noted above.      Additional Documentation  None    Medical Decision Making / Diagnosis     MEHRAN   Ag Wright is a 64 year old male who presented to the emergency department with back pain.  There is nothing new about his back pain but worsened today since he has not been able to take his pain medication other than 1 time in the morning.  There was a prescription mixup and he cannot get the prescription until tomorrow.  I did give 1 dose of his home oxycodone dose to hopefully get him through the night until he  can get his prescription tomorrow.  No indication for additional testing this time given this is chronic pain    Disposition   The patient was discharged.     Diagnosis     ICD-10-CM    1. Acute exacerbation of chronic low back pain  M54.50     G89.29            Scribe Disclosure:  I, BOBBY ESTEBAN, am serving as a scribe at 11:49 PM on 3/24/2025 to document services personally performed by Yelitza Paris MD based on my observations and the provider's statements to me.      Yelitza Paris MD  03/25/25 0433

## 2025-05-04 ENCOUNTER — HEALTH MAINTENANCE LETTER (OUTPATIENT)
Age: 65
End: 2025-05-04

## 2025-05-05 ENCOUNTER — TRANSCRIBE ORDERS (OUTPATIENT)
Dept: URGENT CARE | Facility: CLINIC | Age: 65
End: 2025-05-05
Payer: COMMERCIAL

## 2025-05-05 DIAGNOSIS — M25.511 ACUTE PAIN OF RIGHT SHOULDER: ICD-10-CM

## 2025-05-05 DIAGNOSIS — G89.4 CHRONIC PAIN SYNDROME: Primary | ICD-10-CM

## 2025-05-05 DIAGNOSIS — M41.9 SCOLIOSIS OF THORACOLUMBAR SPINE, UNSPECIFIED SCOLIOSIS TYPE: ICD-10-CM

## 2025-06-02 ENCOUNTER — PRE VISIT (OUTPATIENT)
Dept: ORTHOPEDICS | Facility: CLINIC | Age: 65
End: 2025-06-02

## 2025-06-02 ENCOUNTER — TELEPHONE (OUTPATIENT)
Dept: ORTHOPEDICS | Facility: CLINIC | Age: 65
End: 2025-06-02

## 2025-06-02 ENCOUNTER — OFFICE VISIT (OUTPATIENT)
Dept: ORTHOPEDICS | Facility: CLINIC | Age: 65
End: 2025-06-02
Payer: COMMERCIAL

## 2025-06-02 ENCOUNTER — TRANSCRIBE ORDERS (OUTPATIENT)
Dept: OTHER | Age: 65
End: 2025-06-02

## 2025-06-02 ENCOUNTER — ANCILLARY PROCEDURE (OUTPATIENT)
Dept: GENERAL RADIOLOGY | Facility: CLINIC | Age: 65
End: 2025-06-02
Attending: FAMILY MEDICINE
Payer: COMMERCIAL

## 2025-06-02 ENCOUNTER — LAB REQUISITION (OUTPATIENT)
Dept: LAB | Facility: CLINIC | Age: 65
End: 2025-06-02
Payer: COMMERCIAL

## 2025-06-02 ENCOUNTER — MEDICAL CORRESPONDENCE (OUTPATIENT)
Dept: HEALTH INFORMATION MANAGEMENT | Facility: CLINIC | Age: 65
End: 2025-06-02

## 2025-06-02 DIAGNOSIS — F41.8 MIXED ANXIETY DEPRESSIVE DISORDER: ICD-10-CM

## 2025-06-02 DIAGNOSIS — M19.011 PRIMARY OSTEOARTHRITIS OF RIGHT SHOULDER: Primary | ICD-10-CM

## 2025-06-02 DIAGNOSIS — G89.29 CHRONIC RIGHT SHOULDER PAIN: Primary | ICD-10-CM

## 2025-06-02 DIAGNOSIS — M25.511 CHRONIC RIGHT SHOULDER PAIN: ICD-10-CM

## 2025-06-02 DIAGNOSIS — G89.29 CHRONIC RIGHT SHOULDER PAIN: ICD-10-CM

## 2025-06-02 DIAGNOSIS — M25.511 CHRONIC RIGHT SHOULDER PAIN: Primary | ICD-10-CM

## 2025-06-02 DIAGNOSIS — M25.511 RIGHT SHOULDER PAIN: Primary | ICD-10-CM

## 2025-06-02 DIAGNOSIS — M25.511 RIGHT SHOULDER PAIN: ICD-10-CM

## 2025-06-02 DIAGNOSIS — I50.32 CHRONIC DIASTOLIC (CONGESTIVE) HEART FAILURE (H): ICD-10-CM

## 2025-06-02 LAB
ALBUMIN SERPL BCG-MCNC: 4.5 G/DL (ref 3.5–5.2)
ANION GAP SERPL CALCULATED.3IONS-SCNC: 16 MMOL/L (ref 7–15)
BUN SERPL-MCNC: 14.4 MG/DL (ref 8–23)
CALCIUM SERPL-MCNC: 9.6 MG/DL (ref 8.8–10.4)
CHLORIDE SERPL-SCNC: 99 MMOL/L (ref 98–107)
CREAT SERPL-MCNC: 1.17 MG/DL (ref 0.67–1.17)
EGFRCR SERPLBLD CKD-EPI 2021: 70 ML/MIN/1.73M2
GLUCOSE SERPL-MCNC: 236 MG/DL (ref 70–99)
HCO3 SERPL-SCNC: 25 MMOL/L (ref 22–29)
PHOSPHATE SERPL-MCNC: 3.7 MG/DL (ref 2.5–4.5)
POTASSIUM SERPL-SCNC: 3.5 MMOL/L (ref 3.4–5.3)
SODIUM SERPL-SCNC: 140 MMOL/L (ref 135–145)

## 2025-06-02 PROCEDURE — 80069 RENAL FUNCTION PANEL: CPT | Mod: ORL | Performed by: INTERNAL MEDICINE

## 2025-06-02 PROCEDURE — 73030 X-RAY EXAM OF SHOULDER: CPT | Mod: RT | Performed by: RADIOLOGY

## 2025-06-02 PROCEDURE — 99213 OFFICE O/P EST LOW 20 MIN: CPT | Performed by: FAMILY MEDICINE

## 2025-06-02 SDOH — HEALTH STABILITY: PHYSICAL HEALTH: ON AVERAGE, HOW MANY DAYS PER WEEK DO YOU ENGAGE IN MODERATE TO STRENUOUS EXERCISE (LIKE A BRISK WALK)?: 0 DAYS

## 2025-06-02 NOTE — TELEPHONE ENCOUNTER
ATC LVM for the patient informing him that Dr. Ortiz does not complete certain shoulder injections and that it is possible that he may not be able to complete the requested injection today. Patient was told that Dr. Ortiz would be happy to see him for evaluation and to determine plan of care and if an injection is recommended we can get him scheduled for an injection in the near future. Patient was encouraged to call back to discuss.    Jessie Ovalles MS, LAT, ATC  Certified Athletic Trainer

## 2025-06-02 NOTE — TELEPHONE ENCOUNTER
DIAGNOSIS: Right shoulder pain wants an injection/Self/[facility]/No images/DS 06/02/25 XR Today     APPOINTMENT DATE: 6/2/25   NOTES STATUS DETAILS   DISCHARGE REPORT from the ER CE 5/5/25  [facility]   MEDICATION LIST Internal    XRAYS (IMAGES & REPORTS) In process *sched* for 6/2/25  XR Shoulder Right

## 2025-06-02 NOTE — PROGRESS NOTES
Sports Medicine Clinic Visit    PCP: [facility]    Ag Wright is a 64 year old male who is seen  as self referral presenting with right shoulder pain.      Location of Pain: right shoulder, GH joint  Duration of Pain: 3 Years, chronic; 10 days ago he had a fall out of his scooter and onto his right forearm and right shoulder  Rating of Pain: 9-10/10  Pain is better with: Massage, temporary relief; lidocaine cream, oxycodone, and additional pain medication provided by Dr. Sparks  Pain is worse with: general arm and shoulder movements of outstretched arm, overhead reaching.  Additional Features: arm/shoulder limited ROM  Treatment so far consists of: PT and OT but is difficult to complete due to energy issues  Prior History of related problems: History of cervical surgery 3 years, completed OT and PT for the cervical issue. History of scoliosis which affects the patient's gait.            MRI of the right shoulder 10 months ago consistent with advanced right shoulder glenohumeral DJD.    Status post spinal C4-C5 and C5-C6 ACDF on 1/24/2024 (Dr Ricketts, Neurosurgery [facility] [place])     Patient history of type 2 diabetes mellitus on insulin, diastolic congestive heart failure, cervical spinal stenosis status post multilevel spinal fusion..  Chronic low back discomfort.  Thoracolumbar scoliosis.  H/o hospitalization provoked pulmonary embolism (Feb 8, 2024), two weeks after spinal surgery; prior to admission, the patient was on a complex pain management regimen requiring the team to transition him back on to his PTA suboxone prior to discharge.    Status post right knee cortisone injection 11/18/2024        CT SPINE CERVICAL NO IV CON 9/12/2024 [facility] [place]  Impression:    1. No acute fracture or traumatic subluxation of the cervical vertebrae.  2. Postoperative changes of C4-C6 ACDF. Multilevel degenerative changes of the cervical spine with foraminal stenosis most severe between C4 and C6.      MRI of the right shoulder, [facility]  [facility], ordered by Dr. Sparks  8/10/2024, 10 months ago, consistent with moderate supraspinatus tendinopathy without tendon tear, mild to moderate infraspinatus tendinopathy without tendon tear.  Moderate distal subscapularis tendinopathy without tendon tear.  Type II acromion.  No marrow edema or bony pathology.  Mild to moderate AC joint DJD.  Mild tendinopathy of the long head of the biceps.  Generalized grade IV chondromalacia of the humeral head with moderate marked inferomedial osteophytosis consistent with advanced osteoarthritis of the glenohumeral joint.      PMH:  Past Medical History:   Diagnosis Date    Arthritis     Cervicalgia 5/12/2009    Chronic back pain     Depression     Gastro-oesophageal reflux disease     Hypertension     Kidney stones     Sleep apnea     Type 2 diabetes mellitus without complications (H)    Opioid type dependence, continuous ([facility]) (Primary Dx);   Chronic bilateral low back pain with right-sided sciatica;   Weakness;   Chronic diastolic congestive heart failure ([facility]-CMS);   Controlled type 2 diabetes mellitus without complication, with long-term current use of insulin ([facility]);   Chronic right shoulder pain;   BMI 40.0-44.9, adult ([facility]);   Cervical stenosis of spinal canal;   Chronic bilateral low back pain without sciatica;   Scoliosis of thoracolumbar spine, unspecified scoliosis type;   Moderate persistent asthma, unspecified whether complicated ([facility])       Calcium nephrolithiasis 2/18/2020   Class 2 obesity in adult 01/14/2010   Essential (primary) hypertension 10/02/2006   Gout 05/21/2009   Hydrocele and spermatocele 01/29/2013   LUMBAGO/ LOW BACK PAIN 05/04/2007   s/p L3-5 surgery 2014   Major depressive disorder, recurrent, in remission 05/03/2018   Male erectile dysfunction 02/10/2017   Sleep apnea 10/04/2006   Type 2 diabetes mellitus 10/04/2006   Active problem list:  Patient Active Problem List   Diagnosis    Chronic pain syndrome    Chronic low back pain     Cervicalgia    Degenerative arthritis of spine    Central and Lateral spinal stenosis    Overweight    Physical deconditioning    Mixed anxiety depressive disorder    Knee pain    Low back pain    Edema    Nasal septal abscess       FH:  Family History   Problem Relation Age of Onset    Heart Disease Mother         disease    Hypertension Mother     Other - See Comments Mother         migraines    Cancer Father         leukiemia    Depression Other     Glaucoma No family hx of     Macular Degeneration No family hx of        SH:  Social History     Socioeconomic History    Marital status:      Spouse name: Not on file    Number of children: Not on file    Years of education: Not on file    Highest education level: Not on file   Occupational History    Not on file   Tobacco Use    Smoking status: Never    Smokeless tobacco: Never   Substance and Sexual Activity    Alcohol use: No    Drug use: No    Sexual activity: Not on file   Other Topics Concern    Not on file   Social History Narrative    Not on file     Social Drivers of Health     Financial Resource Strain: Patient Unable To Answer (9/13/2024)    Received from [facility]    Overall Financial Resource Strain (CARDIA)     Difficulty of Paying Living Expenses: Patient unable to answer   Recent Concern: Financial Resource Strain - At Risk (8/7/2024)    Received from [facility]    Financial Resource Strain     Financial Resource Strain: 2   Food Insecurity: Patient Unable To Answer (9/13/2024)    Received from [facility]    Hunger Vital Sign     Worried About Running Out of Food in the Last Year: Patient unable to answer     Ran Out of Food in the Last Year: Patient unable to answer   Recent Concern: Food Insecurity - At Risk (8/7/2024)    Received from [facility]    Food Insecurity     Food: 2   Transportation Needs: Patient Unable To Answer (9/13/2024)    Received from [facility]    PRAPARE - Transportation     Lack of Transportation  (Medical): Patient unable to answer     Lack of Transportation (Non-Medical): Patient unable to answer   Physical Activity: Not on File (2019)    Received from [facility]    Physical Activity     Physical Activity: 0   Stress: Not on File (2023)    Received from [facility]    Stress     Stress: 0   Recent Concern: Stress - At Risk (2023)    Received from [facility]    Stress     Stress: 2   Social Connections: Not on File (2023)    Received from [facility]    Social Connections     Connectedness: 0   Recent Concern: Social Connections - At Risk (2023)    Received from [facility]    Social Connections     Social Connections and Isolation: 2   Interpersonal Safety: Patient Unable To Answer (2024)    Received from [facility]    Humiliation, Afraid, Rape, and Kick questionnaire     Fear of Current or Ex-Partner: Patient unable to answer     Emotionally Abused: Patient unable to answer     Physically Abused: Patient unable to answer     Sexually Abused: Patient unable to answer   Housing Stability: Unknown (2024)    Received from [facility]    Housing Stability     What is your housing situation today?: 5 - Patient unable to answer   Recent Concern: Housing Stability - At Risk (2024)    Received from [employer]    Housing Stability     Housin       MEDS:  See EMR, reviewed  ALL:  See EMR, reviewed  ALL:  See EMR, reviewed  Allergen Reactions   Milnacipran Palpitations   [employer]#[id number]   Codeine   Amitriptyline   vivid dreams. [employer]Record#41   Aspirin   [employer]#37   Azithromycin Nausea and Vomiting   Erythromycin Nausea and Vomiting   Gabapentin   vivid dreams. [employer]Record#40   Gemfibrozil   LegacyRecord#[id number]   Iodinated Contrast Media Unknown   1/4/15: pretreated with only IV benadryl and did fine.   Labetalol   Severe headache.. LegacyRecord#[id number]   Prochlorperazine   LegacyRecord#36   Rofecoxib   Hypertention. LegacyRecord#38   Sulfa (Sulfonamide Antibiotics) Nausea and Vomiting    Sulfamethoxazole-Trimethoprim       REVIEW OF SYSTEMS:  CONSTITUTIONAL:NEGATIVE for fever, chills, change in weight  INTEGUMENTARY/SKIN: NEGATIVE for worrisome rashes, moles or lesions  EYES: NEGATIVE for vision changes or irritation  ENT/MOUTH: NEGATIVE for ear, mouth and throat problems  RESP:NEGATIVE for significant cough or SOB  BREAST: NEGATIVE for masses, tenderness or discharge  CV: NEGATIVE for chest pain, palpitations or peripheral edema  GI: NEGATIVE for nausea, abdominal pain, heartburn, or change in bowel habits  :NEGATIVE for frequency, dysuria, or hematuria  :NEGATIVE for frequency, dysuria, or hematuria  NEURO: NEGATIVE for weakness, dizziness or paresthesias  ENDOCRINE: NEGATIVE for temperature intolerance, skin/hair changes  HEME/ALLERGY/IMMUNE: NEGATIVE for bleeding problems  PSYCHIATRIC: NEGATIVE for changes in mood or affect        Objective: Drop sign of the right shoulder is negative.  No signs of bruising or skin disruption with the right shoulder.  5 out of 5 strength bilaterally at the infraspinatus and subscapularis rotator cuff.  Nontender about the right scapula.  Nontender over the AC joint or anterior cuff of the right shoulder.  Nontender over the biceps tendon.  He has severely limited range of motion in the right shoulder to internal rotation and external rotation with crepitance noted, consistent with severe DJD.  Sensation in the right upper extremity is normal.  Appropriate in conversation and affect.    Personally reviewed with the patient the results of his previous shoulder MRI and x-rays of the right shoulder today that show no acute fracture but did show severe glenohumeral DJD with bone-on-bone changes and large peripheral spurs      Assessment: Right-sided glenohumeral DJD, severe    Plan: Patient has tried physical therapy in the past without improvements.  We discussed how with this degree of arthritis I cannot expect physical therapy would make improvements.  We  discussed that an ultrasound-guided right-sided glenohumeral capsular injection could provide temporary relief of pain, although I am not sure in the setting of this severe of an arthritis presentation, how long pain relief can be expected.  Patient chooses to avoid a cortisone shot today.  He would like to discuss options for shoulder replacement with a an orthopedic surgeon.  Referral to orthopedic surgeon placed.

## 2025-06-02 NOTE — LETTER
6/2/2025      RE: Ag Wright  [address]  [facility] [address]     Dear Colleague,    Thank you for referring your patient, Ag Wright, to the [facility] [place]. Please see a copy of my visit note below.    Sports Medicine Clinic Visit    PCP: [facility]    Ag Wright is a 64 year old male who is seen  as self referral presenting with right shoulder pain.      Location of Pain: right shoulder, GH joint  Duration of Pain: 3 Years, chronic; 10 days ago he had a fall out of his scooter and onto his right forearm and right shoulder  Rating of Pain: 9-10/10  Pain is better with: Massage, temporary relief; lidocaine cream, oxycodone, and additional pain medication provided by Dr. Sparks  Pain is worse with: general arm and shoulder movements of outstretched arm, overhead reaching.  Additional Features: arm/shoulder limited ROM  Treatment so far consists of: PT and OT but is difficult to complete due to energy issues  Prior History of related problems: History of cervical surgery 3 years, completed OT and PT for the cervical issue. History of scoliosis which affects the patient's gait.            MRI of the right shoulder 10 months ago consistent with advanced right shoulder glenohumeral DJD.    Status post spinal C4-C5 and C5-C6 ACDF on 1/24/2024 (Dr Ricketts, Neurosurgery [facility])     Patient history of type 2 diabetes mellitus on insulin, diastolic congestive heart failure, cervical spinal stenosis status post multilevel spinal fusion..  Chronic low back discomfort.  Thoracolumbar scoliosis.  H/o hospitalization provoked pulmonary embolism (Feb 8, 2024), two weeks after spinal surgery; prior to admission, the patient was on a complex pain management regimen requiring the team to transition him back on to his PTA suboxone prior to discharge.    Status post right knee cortisone injection 11/18/2024        CT SPINE CERVICAL NO IV CON 9/12/2024 [facility]  Impression:    1. No  acute fracture or traumatic subluxation of the cervical vertebrae.  2. Postoperative changes of C4-C6 ACDF. Multilevel degenerative changes of the cervical spine with foraminal stenosis most severe between C4 and C6.      MRI of the right shoulder, BOUBACAR Gimenez, ordered by Dr. Sparks  8/10/2024, 10 months ago, consistent with moderate supraspinatus tendinopathy without tendon tear, mild to moderate infraspinatus tendinopathy without tendon tear.  Moderate distal subscapularis tendinopathy without tendon tear.  Type II acromion.  No marrow edema or bony pathology.  Mild to moderate AC joint DJD.  Mild tendinopathy of the long head of the biceps.  Generalized grade IV chondromalacia of the humeral head with moderate marked inferomedial osteophytosis consistent with advanced osteoarthritis of the glenohumeral joint.      PMH:  Past Medical History:   Diagnosis Date     Arthritis      Cervicalgia 5/12/2009     Chronic back pain      Depression      Gastro-oesophageal reflux disease      Hypertension      Kidney stones      Sleep apnea      Type 2 diabetes mellitus without complications (H)    Opioid type dependence, continuous ([facility]) (Primary Dx);   Chronic bilateral low back pain with right-sided sciatica;   Weakness;   Chronic diastolic congestive heart failure ([facility]);   Controlled type 2 diabetes mellitus without complication, with long-term current use of insulin ([facility]);   Chronic right shoulder pain;   BMI 40.0-44.9, adult ([facility]);   Cervical stenosis of spinal canal;   Chronic bilateral low back pain without sciatica;   Scoliosis of thoracolumbar spine, unspecified scoliosis type;   Moderate persistent asthma, unspecified whether complicated ([facility])       Calcium nephrolithiasis 2/18/2020   Class 2 obesity in adult 01/14/2010   Essential (primary) hypertension 10/02/2006   Gout 05/21/2009   Hydrocele and spermatocele 01/29/2013   LUMBAGO/ LOW BACK PAIN 05/04/2007   s/p L3-5 surgery 2014   Major  depressive disorder, recurrent, in remission 05/03/2018   Male erectile dysfunction 02/10/2017   Sleep apnea 10/04/2006   Type 2 diabetes mellitus 10/04/2006   Active problem list:  Patient Active Problem List   Diagnosis     Chronic pain syndrome     Chronic low back pain     Cervicalgia     Degenerative arthritis of spine     Central and Lateral spinal stenosis     Overweight     Physical deconditioning     Mixed anxiety depressive disorder     Knee pain     Low back pain     Edema     Nasal septal abscess       FH:  Family History   Problem Relation Age of Onset     Heart Disease Mother         disease     Hypertension Mother      Other - See Comments Mother         migraines     Cancer Father         leukiemia     Depression Other      Glaucoma No family hx of      Macular Degeneration No family hx of        SH:  Social History     Socioeconomic History     Marital status:      Spouse name: Not on file     Number of children: Not on file     Years of education: Not on file     Highest education level: Not on file   Occupational History     Not on file   Tobacco Use     Smoking status: Never     Smokeless tobacco: Never   Substance and Sexual Activity     Alcohol use: No     Drug use: No     Sexual activity: Not on file   Other Topics Concern     Not on file   Social History Narrative     Not on file     Social Drivers of Health     Financial Resource Strain: Patient Unable To Answer (9/13/2024)    Received from [facility]    Overall Financial Resource Strain (CARDIA)      Difficulty of Paying Living Expenses: Patient unable to answer   Recent Concern: Financial Resource Strain - At Risk (8/7/2024)    Received from [facility]    Financial Resource Strain      Financial Resource Strain: 2   Food Insecurity: Patient Unable To Answer (9/13/2024)    Received from [facility]    Hunger Vital Sign      Worried About Running Out of Food in the Last Year: Patient unable to answer      Ran Out of Food  in the Last Year: Patient unable to answer   Recent Concern: Food Insecurity - At Risk (2024)    [facility]    Food Insecurity      Food: 2   Transportation Needs: Patient Unable To Answer (2024)    Received from [facility]    PRAPARE - Transportation      Lack of Transportation (Medical): Patient unable to answer      Lack of Transportation (Non-Medical): Patient unable to answer   Physical Activity: Not on File (2019)    [facility]    Physical Activity      Physical Activity: 0   Stress: Not on File (2023)    Received from [employer]    Stress      Stress: 0   Recent Concern: Stress - At Risk (2023)    Received from [employer]    Stress      Stress: 2   Social Connections: Not on File (2023)    [facility]    Social Connections      Connectedness: 0   Recent Concern: Social Connections - At Risk (2023)    [facility]    Social Connections      Social Connections and Isolation: 2   Interpersonal Safety: Patient Unable To Answer (2024)    Received from [facility]    Humiliation, Afraid, Rape, and Kick questionnaire      Fear of Current or Ex-Partner: Patient unable to answer      Emotionally Abused: Patient unable to answer      Physically Abused: Patient unable to answer      Sexually Abused: Patient unable to answer   Housing Stability: Unknown (2024)    Received from [facility]    Housing Stability      What is your housing situation today?: 5 - Patient unable to answer   Recent Concern: Housing Stability - At Risk (2024)    Received from [facility]    Housing Stability      Housin       MEDS:  See EMR, reviewed  ALL:  See EMR, reviewed  ALL:  See EMR, reviewed  Allergen Reactions   Milnacipran Palpitations   [employer]acyRecord#[id number]   Codeine   Amitriptyline   vivid dreams. LegacyRecord#41   Aspirin   [employer]acyRecord#37   Azithromycin Nausea and Vomiting   Erythromycin Nausea and Vomiting   Gabapentin   vivid dreams.  LegacyRecord#40   Gemfibrozil   LegacyRecord#[id number]   Iodinated Contrast Media Unknown   1/4/15: pretreated with only IV benadryl and did fine.   Labetalol   Severe headache.. LegacyRecord#[id number]   Prochlorperazine   LegacyRecord#36   Rofecoxib   Hypertention. LegacyRecord#38   Sulfa (Sulfonamide Antibiotics) Nausea and Vomiting   Sulfamethoxazole-Trimethoprim       REVIEW OF SYSTEMS:  CONSTITUTIONAL:NEGATIVE for fever, chills, change in weight  INTEGUMENTARY/SKIN: NEGATIVE for worrisome rashes, moles or lesions  EYES: NEGATIVE for vision changes or irritation  ENT/MOUTH: NEGATIVE for ear, mouth and throat problems  RESP:NEGATIVE for significant cough or SOB  BREAST: NEGATIVE for masses, tenderness or discharge  CV: NEGATIVE for chest pain, palpitations or peripheral edema  GI: NEGATIVE for nausea, abdominal pain, heartburn, or change in bowel habits  :NEGATIVE for frequency, dysuria, or hematuria  :NEGATIVE for frequency, dysuria, or hematuria  NEURO: NEGATIVE for weakness, dizziness or paresthesias  ENDOCRINE: NEGATIVE for temperature intolerance, skin/hair changes  HEME/ALLERGY/IMMUNE: NEGATIVE for bleeding problems  PSYCHIATRIC: NEGATIVE for changes in mood or affect        Objective: Drop sign of the right shoulder is negative.  No signs of bruising or skin disruption with the right shoulder.  5 out of 5 strength bilaterally at the infraspinatus and subscapularis rotator cuff.  Nontender about the right scapula.  Nontender over the AC joint or anterior cuff of the right shoulder.  Nontender over the biceps tendon.  He has severely limited range of motion in the right shoulder to internal rotation and external rotation with crepitance noted, consistent with severe DJD.  Sensation in the right upper extremity is normal.  Appropriate in conversation and affect.    Personally reviewed with the patient the results of his previous shoulder MRI and x-rays of the right shoulder today that show no acute fracture but did  show severe glenohumeral DJD with bone-on-bone changes and large peripheral spurs      Assessment: Right-sided glenohumeral DJD, severe    Plan: Patient has tried physical therapy in the past without improvements.  We discussed how with this degree of arthritis I cannot expect physical therapy would make improvements.  We discussed that an ultrasound-guided right-sided glenohumeral capsular injection could provide temporary relief of pain, although I am not sure in the setting of this severe of an arthritis presentation, how long pain relief can be expected.  Patient chooses to avoid a cortisone shot today.  He would like to discuss options for shoulder replacement with a an orthopedic surgeon.  Referral to orthopedic surgeon placed.                        Again, thank you for allowing me to participate in the care of your patient.      Sincerely,    Cesar Ortiz MD

## 2025-06-03 ENCOUNTER — PATIENT OUTREACH (OUTPATIENT)
Dept: CARE COORDINATION | Facility: CLINIC | Age: 65
End: 2025-06-03
Payer: COMMERCIAL

## 2025-06-03 ENCOUNTER — TELEPHONE (OUTPATIENT)
Dept: ORTHOPEDICS | Facility: CLINIC | Age: 65
End: 2025-06-03
Payer: COMMERCIAL

## 2025-06-03 NOTE — TELEPHONE ENCOUNTER
Left Voicemail (1st Attempt) and Sent Mychart (1st Attempt) for the patient to call back and schedule the following:    Appointment type: NEW SHOULDER  Provider:  or   Return date: Next Available at [facility] or   Specialty phone number: [phone]

## 2025-06-05 ENCOUNTER — PATIENT OUTREACH (OUTPATIENT)
Dept: CARE COORDINATION | Facility: CLINIC | Age: 65
End: 2025-06-05
Payer: COMMERCIAL

## 2025-06-05 NOTE — TELEPHONE ENCOUNTER
Left Voicemail (2nd Attempt) and Sent Mychart (2nd Attempt) for the patient to call back and schedule the following:    Appointment type: NEW SHOULDER  Provider:  or   Return date: Next Available at [facility] [place]   Specialty phone number: [phone]  MAX attempts made to schedule

## 2025-06-10 NOTE — TELEPHONE ENCOUNTER
DIAGNOSIS: Chronic right shoulder pain [M25.511, G89.29]   APPOINTMENT DATE: 06/11/2025   NOTES STATUS DETAILS   OFFICE NOTE from referring provider Internal 06/02/2025 -   Cesar Ortiz MD  Sports Medicine   OFFICE NOTE from other specialist Internal    MRI PACS [facility]:  08/10/2024 - RT Shoulder    CT SCAN PACS Internal   XRAYS (IMAGES & REPORTS) PACS Internal

## 2025-06-10 NOTE — PROGRESS NOTES
CHIEF COMPLAINT: Right shoulder pain    DIAGNOSIS: severe right shoulder OA    OCCUPATION/SPORT: unemployed    HPI:   Ag Wright is a very pleasant 64 year old, right-hand dominant male who presents for evaluation of right shoulder pain after being referred by Dr. Ortiz. Symptoms started 3 years ago, but then 3-4 weeks ago he re-injured his shoulder. There was a precipitating event when he fell off his scooter and onto his right arm. The pain is located to the lateral shoulder. Worst pain is rated a 9 of 10, and current pain is rated at 9 of 10. Symptoms are worsened by range of motion and over head movements. Symptoms are improved with massage, oxycodone and lidocaine cream. The patient takes 10 mg oxy q4h everyday. Patient has tried physical therapy, OT with mild relief. Associated symptoms include pain. Patient has pain throughout his right arm but most intensely about his shoulder with ROM. Also notes numbness in his right 1-3 fingers. Notably, the patient has had a right shoulder MRI on MRI of the right shoulder 10 months ago consistent with advanced right shoulder glenohumeral DJD. No other concerns or complaints at this time.  SANE score R - 10 L - 70    PAST MEDICAL HISTORY:  Past Medical History:   Diagnosis Date    Arthritis     Cervicalgia 5/12/2009    Chronic back pain     Depression     Gastro-oesophageal reflux disease     Hypertension     Kidney stones     Sleep apnea     Type 2 diabetes mellitus without complications (H)        PAST SURGICAL HISTORY:  Past Surgical History:   Procedure Laterality Date    back sx[      x 3    ENDOSCOPIC ENDONASAL SURGERY  1/13/2014    Procedure: ENDOSCOPIC ENDONASAL SURGERY;  Endoscopic Endonasal Approach For Drainage Of Septal Abcess With Intranasal Biopsies and nasal debridement. ;  Surgeon: Mart Petty MD;  Location: [facility] OR    kidney stone removed[      NO HISTORY OF SURGERY      TONSILLECTOMY         CURRENT MEDICATIONS:  Current Outpatient  Medications   Medication Sig Dispense Refill    ALBUTEROL 90 MCG/ACT IN AERS as needed      ATENOLOL 100 MG OR TABS 1 TABLET DAILY      bisacodyl (DULCOLAX) 5 MG EC tablet Take 1 tablet (5 mg) by mouth See Admin Instructions --2 days prior to procedure, take two (2) tablets at 4 pm.  1 day prior to procedure, take two (2) tablets at 4 pm.  For additional instructions refer to you colonoscopy prep instructions. 4 tablet 0    CHLORTHALIDONE 25 MG PO TABS 1 TABLET DAILY      escitalopram (LEXAPRO) 20 MG tablet Take  by mouth. 1.5 TABLET DAILY 45 tablet 2    fenofibrate 160 MG tablet Take by mouth daily      IBUPROFEN 600 MG PO TABS 1 TABLET 3 TIMES DAILY AS NEEDED 90 Tab 2    ketotifen (ZADITOR) 0.025 % ophthalmic solution 1 drop 3 times daily      LISINOPRIL 40 MG PO TABS 1 TABLET DAILY      METFORMIN HCL# 500 MG (MOD) OR **TB24** 1 tab twice daily      MINOXIDIL 10 MG OR TABS 1 tabs daily      oxyCODONE-acetaminophen (PERCOCET) 5-325 MG per tablet Take 1-2 tablets by mouth every 4 hours as needed for pain 30 tablet 0    polyethylene glycol (GOLYTELY) 236 g suspension Take 6,000 mLs by mouth See Admin Instructions --For instructions refer to you colonoscopy prep instructions. 8000 mL 0    sitagliptin (JANUVIA) 50 MG tablet Take 50 mg by mouth daily 1 tablets a day      ZANTAC 300 MG OR TABS 1 TABLET at bedtime      ZYRTEC 10 MG PO TABS 1 TABLET DAILY         ALLERGIES:      Allergies   Allergen Reactions    Amitriptyline Hcl     Aspirin     Compazine     Duloxetine Hcl     Erythromycin     Ivp Dye [Contrast Dye]     Milnacipran Hcl     Neurontin [Gabapentin] Other (See Comments)     Twitching in eyes      Nortriptyline Hcl     Sulfa Antibiotics     Vioxx          FAMILY HISTORY: No pertinent family history, reviewed in EMR.    SOCIAL HISTORY:   Social History     Socioeconomic History    Marital status:      Spouse name: Not on file    Number of children: Not on file    Years of education: Not on file     Highest education level: Not on file   Occupational History    Not on file   Tobacco Use    Smoking status: Never    Smokeless tobacco: Never   Substance and Sexual Activity    Alcohol use: No    Drug use: No    Sexual activity: Not on file   Other Topics Concern    Not on file   Social History Narrative    Not on file     Social Drivers of Health     Financial Resource Strain: Patient Unable To Answer (9/13/2024)    Received from [facility]    Overall Financial Resource Strain (CARDIA)     Difficulty of Paying Living Expenses: Patient unable to answer   Recent Concern: Financial Resource Strain - At Risk (8/7/2024)    Received from [employer]    Financial Resource Strain     Financial Resource Strain: 2   Food Insecurity: Patient Unable To Answer (9/13/2024)    Received from [place] [employer] [facility]    Hunger Vital Sign     Worried About Running Out of Food in the Last Year: Patient unable to answer     Ran Out of Food in the Last Year: Patient unable to answer   Recent Concern: Food Insecurity - At Risk (8/7/2024)    Received from [facility]    Food Insecurity     Food: 2   Transportation Needs: Patient Unable To Answer (9/13/2024)    Received from [facility]    PRAPARE - Transportation     Lack of Transportation (Medical): Patient unable to answer     Lack of Transportation (Non-Medical): Patient unable to answer   Physical Activity: Unknown (6/2/2025)    Exercise Vital Sign     Days of Exercise per Week: 0 days     Minutes of Exercise per Session: Not on file   Stress: Not on File (7/26/2023)    Received from [facility]    Stress     Stress: 0   Recent Concern: Stress - At Risk (7/26/2023)    Received from [facility]    Stress     Stress: 2   Social Connections: Not on File (7/26/2023)    Received from [facility]    Social Connections     Connectedness: 0   Recent Concern: Social Connections - At Risk (7/26/2023)    Received from [facility]    Social Connections     Social Connections and Isolation: 2   Interpersonal Safety:  "Patient Unable To Answer (2024)    [place] [facility]    Humiliation, Afraid, Rape, and Kick questionnaire     Fear of Current or Ex-Partner: Patient unable to answer     Emotionally Abused: Patient unable to answer     Physically Abused: Patient unable to answer     Sexually Abused: Patient unable to answer   Housing Stability: Unknown (2024)    Received from [facility] [place]    Housing Stability     What is your housing situation today?: 5 - Patient unable to answer   Recent Concern: Housing Stability - At Risk (2024)    Received from [employer] Stability     Housin       REVIEW OF SYSTEMS: Positive for that noted in past medical history and history of present illness and otherwise reviewed in EMR    PHYSICAL EXAM:  Patient is 5' 6\" and weighs 219 lbs 0 oz Ht 1.676 m (5' 6\")   Wt 99.3 kg (219 lb)   BMI 35.35 kg/m    Body mass index is 35.35 kg/m .   Constitutional: Well-developed, well-nourished, healthy appearing male.  Skin: Warm, dry   HEENT: Normal  Cardiac: Well perfused extremities, strong 2+ peripheral pulses. No edema.   Pulmonary: Breathing room air    Musculoskeletal:   Right Shoulder:  AROM R shoulder: 80/50/30/back pocket   AROM L shoulder: 100/90/30/L4   PROM L shoulder: 90/50/30/back pocket--limited by pain   4/5 supraspinatus, 4/5 infraspinatus, 4/5 subscapularis-limited byp ain  - AC joint pain, + cross body adduction  + Neer and Mayes impingement signs  + belly-press/lift-off  + Speed's test  No atrophy  Neurovascular exam and cervical spine exam are normal. Endorsing numbness median n. Distribution R hand.     X-RAYS:   AP, lateral, zanca, and axillary radiographs of the right shoulder were ordered and reviewed by me personally showing advanced right shoulder OA with loss of joint space, subchondral sclerosis, osteophyte formation about glenoid and humeral head. Preserved acromiohumeral interval.      ADVANCED IMAGING:     IMPRESSION: 64 year " old-year-old right hand dominant male, with severe right glenohumeral OA.     PLAN:     The etiology and natural history of this condition were discussed.  The patient was shown his imaging and treatment options were discussed.  Discussed that treatment consists of conservative treatment in the form of pain medications and injections and surgical treatment would consist of shoulder arthroplasty.  Patient is very interested in nonoperative treatment and does not wish to consider shoulder arthroplasty at this time.  The patient has tried physical therapy and pain medications with incomplete relief of his pain.  He has not, however, tried an injection.  Discussed that there are two types of injections--glenohumeral and suprascapular nerve block.  The patient is interested in proceeding with nonoperative treatment and would like to try a suprascapular nerve block.  Discussed that if this does not provide sustained relief, he could always try the glenohumeral injection thereafter.  Discussed that injections are intended to provide pain relief but do not improve the underlying pathology and the patient may require a shoulder arthroplasty in the future.  The patient may follow-up on an as-needed basis.    At the conclusion of the office visit, Ag verbally acknowledged that I answered all of his questions satisfactorily.    Cathy Godfrey MD  Orthopedic Surgery PGY-4    I saw the patient with the resident and agree with the findings and the plan of care as documented in the note.    All exam and discussion from beginning to end were done in the presence of my resident, Cathy Coley MD  Orthopedic Surgery Sports Medicine and Shoulder Surgery

## 2025-06-11 ENCOUNTER — OFFICE VISIT (OUTPATIENT)
Dept: ORTHOPEDICS | Facility: CLINIC | Age: 65
End: 2025-06-11
Attending: FAMILY MEDICINE
Payer: COMMERCIAL

## 2025-06-11 ENCOUNTER — PRE VISIT (OUTPATIENT)
Dept: ORTHOPEDICS | Facility: CLINIC | Age: 65
End: 2025-06-11

## 2025-06-11 VITALS — HEIGHT: 66 IN | WEIGHT: 219 LBS | BODY MASS INDEX: 35.2 KG/M2

## 2025-06-11 DIAGNOSIS — M19.011 PRIMARY OSTEOARTHRITIS OF RIGHT SHOULDER: ICD-10-CM

## 2025-06-11 NOTE — LETTER
6/11/2025      Ag Wright  [address]  [place] [address]      Dear Colleague,    Thank you for referring your patient, Ag Wright, to the [facility] [place]. Please see a copy of my visit note below.    CHIEF COMPLAINT: Right shoulder pain    DIAGNOSIS: severe right shoulder OA    OCCUPATION/SPORT: unemployed    HPI:   Ag Wright is a very pleasant 64 year old, right-hand dominant male who presents for evaluation of right shoulder pain after being referred by Dr. Ortiz. Symptoms started 3 years ago, but then 3-4 weeks ago he re-injured his shoulder. There was a precipitating event when he fell off his scooter and onto his right arm. The pain is located to the lateral shoulder. Worst pain is rated a 9 of 10, and current pain is rated at 9 of 10. Symptoms are worsened by range of motion and over head movements. Symptoms are improved with massage, oxycodone and lidocaine cream. The patient takes 10 mg oxy q4h everyday. Patient has tried physical therapy, OT with mild relief. Associated symptoms include pain. Patient has pain throughout his right arm but most intensely about his shoulder with ROM. Also notes numbness in his right 1-3 fingers. Notably, the patient has had a right shoulder MRI on MRI of the right shoulder 10 months ago consistent with advanced right shoulder glenohumeral DJD. No other concerns or complaints at this time.  SANE score R - 10 L - 70    PAST MEDICAL HISTORY:  Past Medical History:   Diagnosis Date     Arthritis      Cervicalgia 5/12/2009     Chronic back pain      Depression      Gastro-oesophageal reflux disease      Hypertension      Kidney stones      Sleep apnea      Type 2 diabetes mellitus without complications (H)        PAST SURGICAL HISTORY:  Past Surgical History:   Procedure Laterality Date     back sx[      x 3     ENDOSCOPIC ENDONASAL SURGERY  1/13/2014    Procedure: ENDOSCOPIC ENDONASAL SURGERY;  Endoscopic Endonasal Approach  For Drainage Of Septal Abcess With Intranasal Biopsies and nasal debridement. ;  Surgeon: Mart Petty MD;  Location: [facility] OR     kidney stone removed[       NO HISTORY OF SURGERY       TONSILLECTOMY         CURRENT MEDICATIONS:  Current Outpatient Medications   Medication Sig Dispense Refill     ALBUTEROL 90 MCG/ACT IN AERS as needed       ATENOLOL 100 MG OR TABS 1 TABLET DAILY       bisacodyl (DULCOLAX) 5 MG EC tablet Take 1 tablet (5 mg) by mouth See Admin Instructions --2 days prior to procedure, take two (2) tablets at 4 pm.  1 day prior to procedure, take two (2) tablets at 4 pm.  For additional instructions refer to you colonoscopy prep instructions. 4 tablet 0     CHLORTHALIDONE 25 MG PO TABS 1 TABLET DAILY       escitalopram (LEXAPRO) 20 MG tablet Take  by mouth. 1.5 TABLET DAILY 45 tablet 2     fenofibrate 160 MG tablet Take by mouth daily       IBUPROFEN 600 MG PO TABS 1 TABLET 3 TIMES DAILY AS NEEDED 90 Tab 2     ketotifen (ZADITOR) 0.025 % ophthalmic solution 1 drop 3 times daily       LISINOPRIL 40 MG PO TABS 1 TABLET DAILY       METFORMIN HCL# 500 MG (MOD) OR **TB24** 1 tab twice daily       MINOXIDIL 10 MG OR TABS 1 tabs daily       oxyCODONE-acetaminophen (PERCOCET) 5-325 MG per tablet Take 1-2 tablets by mouth every 4 hours as needed for pain 30 tablet 0     polyethylene glycol (GOLYTELY) 236 g suspension Take 6,000 mLs by mouth See Admin Instructions --For instructions refer to you colonoscopy prep instructions. 8000 mL 0     sitagliptin (JANUVIA) 50 MG tablet Take 50 mg by mouth daily 1 tablets a day       ZANTAC 300 MG OR TABS 1 TABLET at bedtime       ZYRTEC 10 MG PO TABS 1 TABLET DAILY         ALLERGIES:      Allergies   Allergen Reactions     Amitriptyline Hcl      Aspirin      Compazine      Duloxetine Hcl      Erythromycin      Ivp Dye [Contrast Dye]      Milnacipran Hcl      Neurontin [Gabapentin] Other (See Comments)     Twitching in eyes       Nortriptyline Hcl      Sulfa  Antibiotics      Vioxx          FAMILY HISTORY: No pertinent family history, reviewed in EMR.    SOCIAL HISTORY:   Social History     Socioeconomic History     Marital status:      Spouse name: Not on file     Number of children: Not on file     Years of education: Not on file     Highest education level: Not on file   Occupational History     Not on file   Tobacco Use     Smoking status: Never     Smokeless tobacco: Never   Substance and Sexual Activity     Alcohol use: No     Drug use: No     Sexual activity: Not on file   Other Topics Concern     Not on file   Social History Narrative     Not on file     Social Drivers of Health     Financial Resource Strain: Patient Unable To Answer (9/13/2024)    Received from [facility]    Overall Financial Resource Strain (CARDIA)      Difficulty of Paying Living Expenses: Patient unable to answer   Recent Concern: Financial Resource Strain - At Risk (8/7/2024)    Received from [facility]    Financial Resource Strain      Financial Resource Strain: 2   Food Insecurity: Patient Unable To Answer (9/13/2024)    Received from [facility]    Hunger Vital Sign      Worried About Running Out of Food in the Last Year: Patient unable to answer      Ran Out of Food in the Last Year: Patient unable to answer   Recent Concern: Food Insecurity - At Risk (8/7/2024)    Received from [facility]    Food Insecurity      Food: 2   Transportation Needs: Patient Unable To Answer (9/13/2024)    Received from [facility]    PRAPARE - Transportation      Lack of Transportation (Medical): Patient unable to answer      Lack of Transportation (Non-Medical): Patient unable to answer   Physical Activity: Unknown (6/2/2025)    Exercise Vital Sign      Days of Exercise per Week: 0 days      Minutes of Exercise per Session: Not on file   Stress: Not on File (7/26/2023)    Received from [facility]    Stress      Stress: 0   Recent Concern: Stress - At Risk (7/26/2023)    Received from [facility]  "   Stress      Stress: 2   Social Connections: Not on File (2023)    Received from [facility]    Social [employer]      Connectedness: 0   Recent Concern: Social Connections - At Risk (2023)    Received from [facility]      Social Connections and Isolation: 2   Interpersonal Safety: Patient Unable To Answer (2024)    Received from [facility]    Humiliation, Afraid, Rape, and Kick questionnaire      Fear of Current or Ex-Partner: Patient unable to answer      Emotionally Abused: Patient unable to answer      Physically Abused: Patient unable to answer      Sexually Abused: Patient unable to answer   Housing Stability: Unknown (2024)    Received from [facility]    Housing Stability      What is your housing situation today?: 5 - Patient unable to answer   Recent Concern: Housing Stability - At Risk (2024)    [facility]    Housing Stability      Housin       REVIEW OF SYSTEMS: Positive for that noted in past medical history and history of present illness and otherwise reviewed in EMR    PHYSICAL EXAM:  Patient is 5' 6\" and weighs 219 lbs 0 oz Ht 1.676 m (5' 6\")   Wt 99.3 kg (219 lb)   BMI 35.35 kg/m    Body mass index is 35.35 kg/m .   Constitutional: Well-developed, well-nourished, healthy appearing male.  Skin: Warm, dry   HEENT: Normal  Cardiac: Well perfused extremities, strong 2+ peripheral pulses. No edema.   Pulmonary: Breathing room air    Musculoskeletal:   Right Shoulder:  AROM R shoulder: 80/50/30/back pocket   AROM L shoulder: 100/90/30/L4   PROM L shoulder: 90/50/30/back pocket--limited by pain   4/5 supraspinatus, 4/5 infraspinatus, 4/5 subscapularis-limited byp ain  - AC joint pain, + cross body adduction  + Neer and Mayes impingement signs  + belly-press/lift-off  + Speed's test  No atrophy  Neurovascular exam and cervical spine exam are normal. Endorsing numbness median n. Distribution R hand.     X-RAYS:   AP, lateral, zanca, and " axillary radiographs of the right shoulder were ordered and reviewed by me personally showing advanced right shoulder OA with loss of joint space, subchondral sclerosis, osteophyte formation about glenoid and humeral head. Preserved acromiohumeral interval.      ADVANCED IMAGING:     IMPRESSION: 64 year old-year-old right hand dominant male, with severe right glenohumeral OA.     PLAN:     The etiology and natural history of this condition were discussed.  The patient was shown his imaging and treatment options were discussed.  Discussed that treatment consists of conservative treatment in the form of pain medications and injections and surgical treatment would consist of shoulder arthroplasty.  Patient is very interested in nonoperative treatment and does not wish to consider shoulder arthroplasty at this time.  The patient has tried physical therapy and pain medications with incomplete relief of his pain.  He has not, however, tried an injection.  Discussed that there are two types of injections--glenohumeral and suprascapular nerve block.  The patient is interested in proceeding with nonoperative treatment and would like to try a suprascapular nerve block.  Discussed that if this does not provide sustained relief, he could always try the glenohumeral injection thereafter.  Discussed that injections are intended to provide pain relief but do not improve the underlying pathology and the patient may require a shoulder arthroplasty in the future.  The patient may follow-up on an as-needed basis.    At the conclusion of the office visit, Ag verbally acknowledged that I answered all of his questions satisfactorily.    Cathy Godfrey MD  Orthopedic Surgery PGY-4    I saw the patient with the resident and agree with the findings and the plan of care as documented in the note.    All exam and discussion from beginning to end were done in the presence of my resident, Cathy Coley MD  Orthopedic  Surgery Sports Medicine and Shoulder Surgery    Again, thank you for allowing me to participate in the care of your patient.        Sincerely,        RICO PALACIO MD    Electronically signed

## 2025-06-12 ENCOUNTER — PATIENT OUTREACH (OUTPATIENT)
Dept: CARE COORDINATION | Facility: CLINIC | Age: 65
End: 2025-06-12
Payer: COMMERCIAL

## 2025-06-15 ENCOUNTER — HEALTH MAINTENANCE LETTER (OUTPATIENT)
Age: 65
End: 2025-06-15

## 2025-06-16 ENCOUNTER — PATIENT OUTREACH (OUTPATIENT)
Dept: CARE COORDINATION | Facility: CLINIC | Age: 65
End: 2025-06-16
Payer: COMMERCIAL

## 2025-06-24 ENCOUNTER — OFFICE VISIT (OUTPATIENT)
Dept: ORTHOPEDICS | Facility: CLINIC | Age: 65
End: 2025-06-24
Attending: ORTHOPAEDIC SURGERY
Payer: COMMERCIAL

## 2025-06-24 DIAGNOSIS — M19.011 PRIMARY OSTEOARTHRITIS OF RIGHT SHOULDER: ICD-10-CM

## 2025-06-24 PROCEDURE — 64418 NJX AA&/STRD SPRSCAP NRV: CPT | Mod: RT | Performed by: FAMILY MEDICINE

## 2025-06-24 PROCEDURE — 76942 ECHO GUIDE FOR BIOPSY: CPT | Performed by: FAMILY MEDICINE

## 2025-06-24 SDOH — HEALTH STABILITY: PHYSICAL HEALTH: ON AVERAGE, HOW MANY DAYS PER WEEK DO YOU ENGAGE IN MODERATE TO STRENUOUS EXERCISE (LIKE A BRISK WALK)?: 0 DAYS

## 2025-06-24 NOTE — LETTER
6/24/2025      RE: Ag Wright  [address]  [place] [address]     Dear Colleague,    Thank you for referring your patient, Ag Wright, to the [facility] [place]. Please see a copy of my visit note below.    [facility]  SPORTS & ORTHOPEDIC CLINIC VISIT     Jun 24, 2025              Procedure Note: Ultrasound-Guided Right Suprascapular Nerve Block    Patient Name:Ag Wright  Date of Procedure:June 24, 2025  Procedure Performed by:Douglas Contreras MD  Procedure Indication:Right shoulder pain d/t glenohumeral OA  Anesthesia: 3mL 1% lidocaine, local    ---    Procedure:    1. Preparation:    - The patient was properly identified, and informed consent for the procedure was obtained.  - The patient was positioned in a seated position with the shoulder exposed.  - The procedure site was cleaned and prepped with chlorhexadine  - A sterile field was established.  - The ultrasound machine was prepared with a high-frequency linear transducer    - The necessary equipment, including a sterile 22 gauge 3.5 in needle, syringe, and local anesthetic were gathered.    2. Ultrasound Localization:    - The ultrasound probe was placed over the posterior/superior shoulder region, and the suprascapular nerve was located by visualizing the suprascapular notch.  - The suprascapular artery and surrounding structures were noted for reference.    3. Needle Insertion Under Ultrasound Guidance:    - The needle was inserted in-plane with the ultrasound probe and directed medial to lateral towards the suprascapular nerve at the suprascapular notch.  - Real-time imaging allowed for visualization of the needle tip, ensuring it was positioned adjacent to the suprascapular nerve.  - A solution of 4mL 0.5% bupivicaine and 40mg triamcinolone was injected around the nerve, with careful monitoring of needle advancement and placement under ultrasound guidance.  - The spread of the  solution was observed to ensure adequate coverage of the nerve.  - Images were captured and saved to the permanent record    4. Post-Procedure Care:    - The needle was removed, and gentle pressure was applied to the injection site to prevent bleeding.  - The patient was monitored for any immediate adverse reactions or complications, including signs of infection, vascular injury, or systemic toxicity.  - No immediate complications or adverse events were noted. The patient tolerated the procedure well with no signs of hematoma, infection, or systemic reactions.  - Post-procedure instructions were provided, including:    - Resting the shoulder for 24-48 hours to allow the anesthetic to take full effect.    - Avoiding strenuous physical activity until sensation returns.    - Observing for any signs of infection or worsening pain.      Douglas Contreras MD        Again, thank you for allowing me to participate in the care of your patient.      Sincerely,    Douglas Contreras MD

## 2025-06-24 NOTE — PROGRESS NOTES
[facility]  SPORTS & ORTHOPEDIC CLINIC VISIT     Jun 24, 2025              Procedure Note: Ultrasound-Guided Right Suprascapular Nerve Block    Patient Name:Ag Wright  Date of Procedure:June 24, 2025  Procedure Performed by:Douglas Contreras MD  Procedure Indication:Right shoulder pain d/t glenohumeral OA  Anesthesia: 3mL 1% lidocaine, local    ---    Procedure:    1. Preparation:    - The patient was properly identified, and informed consent for the procedure was obtained.  - The patient was positioned in a seated position with the shoulder exposed.  - The procedure site was cleaned and prepped with chlorhexadine  - A sterile field was established.  - The ultrasound machine was prepared with a high-frequency linear transducer    - The necessary equipment, including a sterile 22 gauge 3.5 in needle, syringe, and local anesthetic were gathered.    2. Ultrasound Localization:    - The ultrasound probe was placed over the posterior/superior shoulder region, and the suprascapular nerve was located by visualizing the suprascapular notch.  - The suprascapular artery and surrounding structures were noted for reference.    3. Needle Insertion Under Ultrasound Guidance:    - The needle was inserted in-plane with the ultrasound probe and directed medial to lateral towards the suprascapular nerve at the suprascapular notch.  - Real-time imaging allowed for visualization of the needle tip, ensuring it was positioned adjacent to the suprascapular nerve.  - A solution of 4mL 0.5% bupivicaine and 40mg triamcinolone was injected around the nerve, with careful monitoring of needle advancement and placement under ultrasound guidance.  - The spread of the solution was observed to ensure adequate coverage of the nerve.  - Images were captured and saved to the permanent record    4. Post-Procedure Care:    - The needle was removed, and gentle pressure was applied to the injection site to prevent bleeding.  -  The patient was monitored for any immediate adverse reactions or complications, including signs of infection, vascular injury, or systemic toxicity.  - No immediate complications or adverse events were noted. The patient tolerated the procedure well with no signs of hematoma, infection, or systemic reactions.  - Post-procedure instructions were provided, including:    - Resting the shoulder for 24-48 hours to allow the anesthetic to take full effect.    - Avoiding strenuous physical activity until sensation returns.    - Observing for any signs of infection or worsening pain.      Douglas Contreras MD

## 2025-06-24 NOTE — NURSING NOTE
[address] [address]  Dept: [phone]  ______________________________________________________________________________    Patient: Ag Wright   : 1960   MRN: [id number]   2025    INVASIVE PROCEDURE SAFETY CHECKLIST    Date: 2025    Procedure:Right Suprascapular Nerve Block USG   Patient Name: Ag Wright  MRN: [id number]  YOB: 1960    Action: Complete sections as appropriate. Any discrepancy results in a HARD COPY until resolved.     PRE PROCEDURE:  Patient ID verified with 2 identifiers (name and  or MRN): Yes  Procedure and site verified with patient/designee (when able): Yes  Accurate consent documentation in medical record: Yes  H&P (or appropriate assessment) documented in medical record: Yes  H&P must be up to 20 days prior to procedure and updates within 24 hours of procedure as applicable: Yes  Relevant diagnostic and radiology test results appropriately labeled and displayed as applicable: Yes  Procedure site(s) marked with provider initials: NA    TIMEOUT:  Time-Out performed immediately prior to starting procedure, including verbal and active participation of all team members addressing the following:Yes  * Correct patient identify  * Confirmed that the correct side and site are marked  * An accurate procedure consent form  * Agreement on the procedure to be done  * Correct patient position  * Relevant images and results are properly labeled and appropriately displayed  * The need to administer antibiotics or fluids for irrigation purposes during the procedure as applicable   * Safety precautions based on patient history or medication use    DURING PROCEDURE: Verification of correct person, site, and procedures any time the responsibility for care of the patient is transferred to another member of the care team.       Prior to injection, verified patient identity using patient's name  and date of birth.  Due to injection administration, patient instructed to remain in clinic for 15 minutes  afterwards, and to report any adverse reaction to me immediately.    Nerve Block was performed    Drug Amount Wasted:  None.  Vial/Syringe: Single dose vial  Expiration Date:  2/30/27 2/30/28 1/30/29    NDC: [address]-0  LOT: [id number]  EXP: 2/30/27    NDC: 09156-131-99  LOT: [id number]  EXP: 2/30/28    NDC: 84337-615-07  LOT: [id number]  EXP: 1/30/29      [place] [facility]  June 24, 2025

## 2025-06-30 ENCOUNTER — LAB REQUISITION (OUTPATIENT)
Dept: LAB | Facility: CLINIC | Age: 65
End: 2025-06-30
Payer: COMMERCIAL

## 2025-06-30 ENCOUNTER — OFFICE VISIT (OUTPATIENT)
Dept: ORTHOPEDICS | Facility: CLINIC | Age: 65
End: 2025-06-30
Payer: COMMERCIAL

## 2025-06-30 ENCOUNTER — ANCILLARY PROCEDURE (OUTPATIENT)
Dept: GENERAL RADIOLOGY | Facility: CLINIC | Age: 65
End: 2025-06-30
Attending: FAMILY MEDICINE
Payer: COMMERCIAL

## 2025-06-30 DIAGNOSIS — G89.29 CHRONIC PAIN OF RIGHT KNEE: ICD-10-CM

## 2025-06-30 DIAGNOSIS — M25.561 CHRONIC PAIN OF RIGHT KNEE: ICD-10-CM

## 2025-06-30 DIAGNOSIS — I15.9 SECONDARY HYPERTENSION, UNSPECIFIED: ICD-10-CM

## 2025-06-30 DIAGNOSIS — M25.561 CHRONIC PAIN OF RIGHT KNEE: Primary | ICD-10-CM

## 2025-06-30 DIAGNOSIS — M17.0 PRIMARY OSTEOARTHRITIS OF BOTH KNEES: Primary | ICD-10-CM

## 2025-06-30 DIAGNOSIS — Z11.1 ENCOUNTER FOR SCREENING FOR RESPIRATORY TUBERCULOSIS: ICD-10-CM

## 2025-06-30 DIAGNOSIS — G89.29 CHRONIC PAIN OF RIGHT KNEE: Primary | ICD-10-CM

## 2025-06-30 LAB
CREAT UR-MCNC: 18.8 MG/DL
MICROALBUMIN UR-MCNC: 123 MG/L
MICROALBUMIN/CREAT UR: 654.26 MG/G CR (ref 0–17)

## 2025-06-30 PROCEDURE — 20610 DRAIN/INJ JOINT/BURSA W/O US: CPT | Mod: 50 | Performed by: FAMILY MEDICINE

## 2025-06-30 PROCEDURE — 82570 ASSAY OF URINE CREATININE: CPT | Mod: ORL | Performed by: INTERNAL MEDICINE

## 2025-06-30 PROCEDURE — 86481 TB AG RESPONSE T-CELL SUSP: CPT | Mod: ORL | Performed by: INTERNAL MEDICINE

## 2025-06-30 PROCEDURE — 82540 ASSAY OF CREATINE: CPT | Mod: ORL | Performed by: INTERNAL MEDICINE

## 2025-06-30 PROCEDURE — 73562 X-RAY EXAM OF KNEE 3: CPT | Mod: LT | Performed by: RADIOLOGY

## 2025-06-30 PROCEDURE — 99213 OFFICE O/P EST LOW 20 MIN: CPT | Mod: 25 | Performed by: FAMILY MEDICINE

## 2025-06-30 RX ORDER — TRIAMCINOLONE ACETONIDE 40 MG/ML
40 INJECTION, SUSPENSION INTRA-ARTICULAR; INTRAMUSCULAR
Status: COMPLETED | OUTPATIENT
Start: 2025-06-30 | End: 2025-06-30

## 2025-06-30 RX ORDER — LIDOCAINE HYDROCHLORIDE 10 MG/ML
4 INJECTION, SOLUTION EPIDURAL; INFILTRATION; INTRACAUDAL; PERINEURAL
Status: COMPLETED | OUTPATIENT
Start: 2025-06-30 | End: 2025-06-30

## 2025-06-30 RX ADMIN — TRIAMCINOLONE ACETONIDE 40 MG: 40 INJECTION, SUSPENSION INTRA-ARTICULAR; INTRAMUSCULAR at 11:37

## 2025-06-30 RX ADMIN — LIDOCAINE HYDROCHLORIDE 4 ML: 10 INJECTION, SOLUTION EPIDURAL; INFILTRATION; INTRACAUDAL; PERINEURAL at 11:37

## 2025-06-30 NOTE — PROGRESS NOTES
Chronic bilateral weightbearing knee pain.  Worse with walking, stationary biking.  Patient has seen physical therapy at [facility] in the past,  without improvement.  X-rays today consistent with severe tricompartmental DJD with bone-on-bone changes in the medial tibiofemoral joint space and patellofemoral spaces, bilaterally.    MRI of the right knee 8/12/2024 consistent with tricompartmental degenerative arthritis most prominent in the medial joint space, complex free edge tear of posterior horn of medial meniscus, 5 mm intra-articular loose body in the lateral compartments, no discrete ACL or PCL tear.  Patient requested physical therapy at that visit, was referred to [facility] PT.        Status post right knee cortisone injection 11/18/2024, 8 months ago.  Patient indicates that it provided pain relief for about 3 months.  At that visit we discussed conservative options including over-the-counter pain medicines and their side effects, pull-up knee sleeve, medial  brace, cortisone injections, Synvisc injections.        Patient has a chart history of multiple medicine intolerances including aspirin, rofecoxib, gabapentin, codeine    Patient history of type 2 diabetes mellitus on insulin, diastolic congestive heart failure, cervical spinal stenosis status post multilevel spinal fusion..  Chronic low back discomfort.  Thoracolumbar scoliosis.  H/o hospitalization provoked pulmonary embolism (Feb 8, 2024), two weeks after spinal surgery; prior to admission, the patient was on a complex pain management regimen requiring the team to transition him back on to his PTA suboxone prior to discharge.     MRI of the right shoulder consistent with advanced right shoulder glenohumeral DJD.     Status post spinal C4-C5 and C5-C6 ACDF on 1/24/2024 (Dr Ricketts, Neurosurgery [facility])       MR SPINE LUMBAR W/O CONTRAST [facility] 10/9/2023     Impression     Impression:    1.Progressed advanced lumbar spondylosis detailed as  above. There is moderate central canal stenosis at L3-4. Severe neuroforaminal narrowing at left T12-L1 and L1-2 neuroforamen, right L2-3 and L3-4 neuroforamen, as well as bilateral L5-S1 neuroforamen.  2.Cholelithiasis.          PMH:  Past Medical History:   Diagnosis Date    Arthritis     Cervicalgia 5/12/2009    Chronic back pain     Depression     Gastro-oesophageal reflux disease     Hypertension     Kidney stones     Sleep apnea     Type 2 diabetes mellitus without complications (H)      Opioid type dependence, continuous ([facility]) (Primary Dx);   Chronic bilateral low back pain with right-sided sciatica;   Weakness;   Chronic diastolic congestive heart failure ([facility]);   Controlled type 2 diabetes mellitus without complication, with long-term current use of insulin ([facility]);   Chronic right shoulder pain;   BMI 40.0-44.9, adult ([facility]);   Cervical stenosis of spinal canal;   Chronic bilateral low back pain without sciatica;   Scoliosis of thoracolumbar spine, unspecified scoliosis type;   Moderate persistent asthma, unspecified whether complicated ([facility])         Calcium nephrolithiasis 2/18/2020   Class 2 obesity in adult 01/14/2010   Essential (primary) hypertension 10/02/2006   Gout 05/21/2009   Hydrocele and spermatocele 01/29/2013   LUMBAGO/ LOW BACK PAIN 05/04/2007   s/p L3-5 surgery 2014   Major depressive disorder, recurrent, in remission 05/03/2018   Male erectile dysfunction 02/10/2017   Sleep apnea 10/04/2006   Type 2 diabetes mellitus 10/04/2006     Active problem list:  Patient Active Problem List   Diagnosis    Chronic pain syndrome    Chronic low back pain    Cervicalgia    Degenerative arthritis of spine    Central and Lateral spinal stenosis    Overweight    Physical deconditioning    Mixed anxiety depressive disorder    Knee pain    Low back pain    Edema    Nasal septal abscess       FH:  Family History   Problem Relation Age of Onset    Heart Disease Mother         disease     Hypertension Mother     Other - See Comments Mother         migraines    Cancer Father         leukiemia    Depression Other     Glaucoma No family hx of     Macular Degeneration No family hx of        SH:  Social History     Socioeconomic History    Marital status:      Spouse name: Not on file    Number of children: Not on file    Years of education: Not on file    Highest education level: Not on file   Occupational History    Not on file   Tobacco Use    Smoking status: Never    Smokeless tobacco: Never   Substance and Sexual Activity    Alcohol use: No    Drug use: No    Sexual activity: Not on file   Other Topics Concern    Not on file   Social History Narrative    Not on file     Social Drivers of Health     Financial Resource Strain: Patient Unable To Answer (9/13/2024)    Received from [facility]    Overall Financial Resource Strain (CARDIA)     Difficulty of Paying Living Expenses: Patient unable to answer   Recent Concern: Financial Resource Strain - At Risk (8/7/2024)    Received from [employer]    Financial Resource Strain     Financial Resource Strain: 2   Food Insecurity: Patient Unable To Answer (9/13/2024)    Received from [facility]    Hunger Vital Sign     Worried About Running Out of Food in the Last Year: Patient unable to answer     Ran Out of Food in the Last Year: Patient unable to answer   Recent Concern: Food Insecurity - At Risk (8/7/2024)    Received from [employer]    Food Insecurity     Food: 2   Transportation Needs: Patient Unable To Answer (9/13/2024)    Received from [facility]    PRAPARE - Transportation     Lack of Transportation (Medical): Patient unable to answer     Lack of Transportation (Non-Medical): Patient unable to answer   Physical Activity: Unknown (6/24/2025)    Exercise Vital Sign     Days of Exercise per Week: 0 days     Minutes of Exercise per Session: Not on file   Stress: Not on File (7/26/2023)    Received from [employer]    Stress     Stress: 0    Recent Concern: Stress - At Risk (2023)    Received from [employer]    Stress     Stress: 2   Social Connections: Not on File (2023)    Received from [employer]    Social [place]     Connectedness: 0   Recent Concern: Social Connections - At Risk (2023)    Received from [employer]    Social [place]     Social Connections and Isolation: 2   Interpersonal Safety: Patient Unable To Answer (2024)    Received from [facility]    Humiliation, Afraid, Rape, and Kick questionnaire     Fear of Current or Ex-Partner: Patient unable to answer     Emotionally Abused: Patient unable to answer     Physically Abused: Patient unable to answer     Sexually Abused: Patient unable to answer   Housing Stability: Unknown (2024)    Received from [facility]    Housing Stability     What is your housing situation today?: 5 - Patient unable to answer   Recent Concern: Housing Stability - At Risk (2024)    [facility]    Housing Stability     Housin       MEDS:  See EMR, reviewed  ALL:  See EMR, reviewed  ALL:  See EMR, reviewed  Allergen Reactions   Milnacipran Palpitations   [employer]acy[employer]#[id number]   Codeine   Amitriptyline   vivid dreams. [employer]#41   Aspirin   [employer]#37   Azithromycin Nausea and Vomiting   Erythromycin Nausea and Vomiting   Gabapentin   vivid dreams. [employer]#40   Gemfibrozil   [employer]acy[employer]#[id number]   Iodinated Contrast Media Unknown   1/4/15: pretreated with only IV benadryl and did fine.   Labetalol   Severe headache.. LegacyRecord#946   Prochlorperazine   LegacyRecord#36   Rofecoxib   Hypertention. LegacyRecord#38   Sulfa (Sulfonamide Antibiotics) Nausea and Vomiting   Sulfamethoxazole-Trimethoprim     REVIEW OF SYSTEMS:  CONSTITUTIONAL:NEGATIVE for fever, chills, change in weight  INTEGUMENTARY/SKIN: NEGATIVE for worrisome rashes, moles or lesions  EYES: NEGATIVE for vision changes or irritation  ENT/MOUTH: NEGATIVE for ear, mouth and throat  problems  RESP:NEGATIVE for significant cough or SOB  BREAST: NEGATIVE for masses, tenderness or discharge  CV: NEGATIVE for chest pain, palpitations or peripheral edema  GI: NEGATIVE for nausea, abdominal pain, heartburn, or change in bowel habits  :NEGATIVE for frequency, dysuria, or hematuria  :NEGATIVE for frequency, dysuria, or hematuria  NEURO: NEGATIVE for weakness, dizziness or paresthesias  ENDOCRINE: NEGATIVE for temperature intolerance, skin/hair changes  HEME/ALLERGY/IMMUNE: NEGATIVE for bleeding problems  PSYCHIATRIC: NEGATIVE for changes in mood or affect    I personally reviewed the patient updated x-rays including bilateral severe tricompartmental DJD changes.    Objective: Bilateral knee varus.  Bilateral knees reveal no effusion.  I can flex and extend the knees fully.  Overlying skin is normal.  Tender over the medial joint lines bilaterally.  Propine conversation and affect.    Assessment: DJD of the bilateral knees    Plan: We have discussed conservative options including over-the-counter pain medicines, pull-up knee sleeve, cortisone injection, Synvisc injections, and indications for knee replacement surgery.  We discussed the basics of knee replacement surgery today and its recovery.  Patient is not wanting to proceed with knee replacement at this time but is considering it for the future.  Out of these options at this time patient would like to proceed with bilateral knee cortisone injections.  After informed consent about bleeding, infection, steroid flare after prep with surgical scrub he was injected in the left knee from the lateral approach in the seated position with 1 cc of Kenalog 40 and 4 cc of 1% lidocaine.  After prepping with surgical scrub he was injected in the opposite knee from a lateral approach in the seated position with the same commendation of medicines.  He tolerated the injections without issue, left the clinic ambulatory and will follow-up as needed.          Large  Joint Injection: bilateral knee    Date/Time: 6/30/2025 11:37 AM    Performed by: Cesar Ortiz MD  Authorized by: Cesar Ortiz MD    Indications:  Pain and osteoarthritis  Needle Size:  25 G  Guidance: landmark guided    Approach:  Superolateral  Location:  Knee  Laterality:  Bilateral      Medications (Right):  40 mg triamcinolone 40 MG/ML; 4 mL lidocaine (PF) 1 %  Medications (Left):  40 mg triamcinolone 40 MG/ML; 4 mL lidocaine (PF) 1 %  Outcome:  Tolerated well, no immediate complications  Procedure discussed: discussed risks, benefits, and alternatives    Timeout: timeout called immediately prior to procedure    Prep: patient was prepped and draped in usual sterile fashion

## 2025-06-30 NOTE — LETTER
6/30/2025      RE: Ag Wright  [address]  [place] [facility] [address]     Dear Colleague,    Thank you for referring your patient, Ag Wright, to the [facility] [place]. Please see a copy of my visit note below.      Chronic bilateral weightbearing knee pain.  Worse with walking, stationary biking.  Patient has seen physical therapy at [facility] in the past,  without improvement.  X-rays today consistent with severe tricompartmental DJD with bone-on-bone changes in the medial tibiofemoral joint space and patellofemoral spaces, bilaterally.    MRI of the right knee 8/12/2024 consistent with tricompartmental degenerative arthritis most prominent in the medial joint space, complex free edge tear of posterior horn of medial meniscus, 5 mm intra-articular loose body in the lateral compartments, no discrete ACL or PCL tear.  Patient requested physical therapy at that visit, was referred to [facility] PT.        Status post right knee cortisone injection 11/18/2024, 8 months ago.  Patient indicates that it provided pain relief for about 3 months.  At that visit we discussed conservative options including over-the-counter pain medicines and their side effects, pull-up knee sleeve, medial  brace, cortisone injections, Synvisc injections.        Patient has a chart history of multiple medicine intolerances including aspirin, rofecoxib, gabapentin, codeine    Patient history of type 2 diabetes mellitus on insulin, diastolic congestive heart failure, cervical spinal stenosis status post multilevel spinal fusion..  Chronic low back discomfort.  Thoracolumbar scoliosis.  H/o hospitalization provoked pulmonary embolism (Feb 8, 2024), two weeks after spinal surgery; prior to admission, the patient was on a complex pain management regimen requiring the team to transition him back on to his PTA suboxone prior to discharge.     MRI of the right shoulder consistent with advanced  right shoulder glenohumeral DJD.     Status post spinal C4-C5 and C5-C6 ACDF on 1/24/2024 (Dr Ricketts, Neurosurgery [facility])       MR SPINE LUMBAR W/O CONTRAST [facility] 10/9/2023     Impression     Impression:    1.Progressed advanced lumbar spondylosis detailed as above. There is moderate central canal stenosis at L3-4. Severe neuroforaminal narrowing at left T12-L1 and L1-2 neuroforamen, right L2-3 and L3-4 neuroforamen, as well as bilateral L5-S1 neuroforamen.  2.Cholelithiasis.          PMH:  Past Medical History:   Diagnosis Date     Arthritis      Cervicalgia 5/12/2009     Chronic back pain      Depression      Gastro-oesophageal reflux disease      Hypertension      Kidney stones      Sleep apnea      Type 2 diabetes mellitus without complications (H)      Opioid type dependence, continuous (HCC-CMS) (Primary Dx);   Chronic bilateral low back pain with right-sided sciatica;   Weakness;   Chronic diastolic congestive heart failure (HCC-CMS);   Controlled type 2 diabetes mellitus without complication, with long-term current use of insulin (HCC-CMS);   Chronic right shoulder pain;   BMI 40.0-44.9, adult ([facility]);   Cervical stenosis of spinal canal;   Chronic bilateral low back pain without sciatica;   Scoliosis of thoracolumbar spine, unspecified scoliosis type;   Moderate persistent asthma, unspecified whether complicated ([facility])         Calcium nephrolithiasis 2/18/2020   Class 2 obesity in adult 01/14/2010   Essential (primary) hypertension 10/02/2006   Gout 05/21/2009   Hydrocele and spermatocele 01/29/2013   LUMBAGO/ LOW BACK PAIN 05/04/2007   s/p L3-5 surgery 2014   Major depressive disorder, recurrent, in remission 05/03/2018   Male erectile dysfunction 02/10/2017   Sleep apnea 10/04/2006   Type 2 diabetes mellitus 10/04/2006     Active problem list:  Patient Active Problem List   Diagnosis     Chronic pain syndrome     Chronic low back pain     Cervicalgia     Degenerative arthritis of spine     Central and  Lateral spinal stenosis     Overweight     Physical deconditioning     Mixed anxiety depressive disorder     Knee pain     Low back pain     Edema     Nasal septal abscess       FH:  Family History   Problem Relation Age of Onset     Heart Disease Mother         disease     Hypertension Mother      Other - See Comments Mother         migraines     Cancer Father         leukiemia     Depression Other      Glaucoma No family hx of      Macular Degeneration No family hx of        SH:  Social History     Socioeconomic History     Marital status:      Spouse name: Not on file     Number of children: Not on file     Years of education: Not on file     Highest education level: Not on file   Occupational History     Not on file   Tobacco Use     Smoking status: Never     Smokeless tobacco: Never   Substance and Sexual Activity     Alcohol use: No     Drug use: No     Sexual activity: Not on file   Other Topics Concern     Not on file   Social History Narrative     Not on file     Social Drivers of Health     Financial Resource Strain: Patient Unable To Answer (9/13/2024)    Received from [facility]    Overall Financial Resource Strain (CARDIA)      Difficulty of Paying Living Expenses: Patient unable to answer   Recent Concern: Financial Resource Strain - At Risk (8/7/2024)    Received from [facility]    Financial Resource Strain      Financial Resource Strain: 2   Food Insecurity: Patient Unable To Answer (9/13/2024)    Received from [facility]    Hunger Vital Sign      Worried About Running Out of Food in the Last Year: Patient unable to answer      Ran Out of Food in the Last Year: Patient unable to answer   Recent Concern: Food Insecurity - At Risk (8/7/2024)    Received from [facility]    Food Insecurity      Food: 2   Transportation Needs: Patient Unable To Answer (9/13/2024)    Received from [facility]    PRAPARE - Transportation      Lack of Transportation (Medical): Patient unable to answer       Lack of Transportation (Non-Medical): Patient unable to answer   Physical Activity: Unknown (2025)    Exercise Vital Sign      Days of Exercise per Week: 0 days      Minutes of Exercise per Session: Not on file   Stress: Not on File (2023)    Received from [facility]    Stress      Stress: 0   Recent Concern: Stress - At Risk (2023)    Received from [employer]    Stress      Stress: 2   Social Connections: Not on File (2023)    Received from [employer]    Social Connections      Connectedness: 0   Recent Concern: Social Connections - At Risk (2023)    Received from [employer]    Social Connections      Social Connections and Isolation: 2   Interpersonal Safety: Patient Unable To Answer (2024)    Received from [facility]    Humiliation, Afraid, Rape, and Kick questionnaire      Fear of Current or Ex-Partner: Patient unable to answer      Emotionally Abused: Patient unable to answer      Physically Abused: Patient unable to answer      Sexually Abused: Patient unable to answer   Housing Stability: Unknown (2024)    Received from [facility]    Housing Stability      What is your housing situation today?: 5 - Patient unable to answer   Recent Concern: Housing Stability - At Risk (2024)    [facility]    H[facility] Stability      [place]ous[place]       MEDS:  See EMR, reviewed  ALL:  See EMR, reviewed  ALL:  See EMR, reviewed  Allergen Reactions   Milnacipran Palpitations   [employer]acyRecord#[id number]   Codeine   Amitriptyline   vivid dreams. [employer]#41   Aspirin   [employer]acy[employer]#37   Azithromycin Nausea and Vomiting   Erythromycin Nausea and Vomiting   Gabapentin   vivid dreams. LegacyRecord#40   Gemfibrozil   [employer]acyRecord#[id number]   Iodinated Contrast Media Unknown   1/4/15: pretreated with only IV benadryl and did fine.   Labetalol   Severe headache.. LegacyRecord#946   Prochlorperazine   LegacyRecord#36   Rofecoxib   Hypertention. LegacyRecord#38   Sulfa (Sulfonamide Antibiotics) Nausea  and Vomiting   Sulfamethoxazole-Trimethoprim     REVIEW OF SYSTEMS:  CONSTITUTIONAL:NEGATIVE for fever, chills, change in weight  INTEGUMENTARY/SKIN: NEGATIVE for worrisome rashes, moles or lesions  EYES: NEGATIVE for vision changes or irritation  ENT/MOUTH: NEGATIVE for ear, mouth and throat problems  RESP:NEGATIVE for significant cough or SOB  BREAST: NEGATIVE for masses, tenderness or discharge  CV: NEGATIVE for chest pain, palpitations or peripheral edema  GI: NEGATIVE for nausea, abdominal pain, heartburn, or change in bowel habits  :NEGATIVE for frequency, dysuria, or hematuria  :NEGATIVE for frequency, dysuria, or hematuria  NEURO: NEGATIVE for weakness, dizziness or paresthesias  ENDOCRINE: NEGATIVE for temperature intolerance, skin/hair changes  HEME/ALLERGY/IMMUNE: NEGATIVE for bleeding problems  PSYCHIATRIC: NEGATIVE for changes in mood or affect    I personally reviewed the patient updated x-rays including bilateral severe tricompartmental DJD changes.    Objective: Bilateral knee varus.  Bilateral knees reveal no effusion.  I can flex and extend the knees fully.  Overlying skin is normal.  Tender over the medial joint lines bilaterally.  Propine conversation and affect.    Assessment: DJD of the bilateral knees    Plan: We have discussed conservative options including over-the-counter pain medicines, pull-up knee sleeve, cortisone injection, Synvisc injections, and indications for knee replacement surgery.  We discussed the basics of knee replacement surgery today and its recovery.  Patient is not wanting to proceed with knee replacement at this time but is considering it for the future.  Out of these options at this time patient would like to proceed with bilateral knee cortisone injections.  After informed consent about bleeding, infection, steroid flare after prep with surgical scrub he was injected in the left knee from the lateral approach in the seated position with 1 cc of Kenalog 40 and 4 cc  of 1% lidocaine.  After prepping with surgical scrub he was injected in the opposite knee from a lateral approach in the seated position with the same commendation of medicines.  He tolerated the injections without issue, left the clinic ambulatory and will follow-up as needed.          Large Joint Injection: bilateral knee    Date/Time: 6/30/2025 11:37 AM    Performed by: Cesar Ortiz MD  Authorized by: Cesar Ortiz MD    Indications:  Pain and osteoarthritis  Needle Size:  25 G  Guidance: landmark guided    Approach:  Superolateral  Location:  Knee  Laterality:  Bilateral      Medications (Right):  40 mg triamcinolone 40 MG/ML; 4 mL lidocaine (PF) 1 %  Medications (Left):  40 mg triamcinolone 40 MG/ML; 4 mL lidocaine (PF) 1 %  Outcome:  Tolerated well, no immediate complications  Procedure discussed: discussed risks, benefits, and alternatives    Timeout: timeout called immediately prior to procedure    Prep: patient was prepped and draped in usual sterile fashion                  Again, thank you for allowing me to participate in the care of your patient.      Sincerely,    Cesar Ortiz MD

## 2025-06-30 NOTE — NURSING NOTE
[address] [address]  Dept: [phone]  ______________________________________________________________________________    Patient: Ag Wright   : 1960   MRN: [id number]   2025    INVASIVE PROCEDURE SAFETY CHECKLIST    Date: 2025     Procedure:Bilateral Knee CSI [place]  Patient Name: Ag Wright  MRN: [id number]  YOB: 1960    Action: Complete sections as appropriate. Any discrepancy results in a HARD COPY until resolved.     PRE PROCEDURE:  Patient ID verified with 2 identifiers (name and  or MRN): Yes  Procedure and site verified with patient/designee (when able): Yes  Accurate consent documentation in medical record: Yes  H&P (or appropriate assessment) documented in medical record: Yes  H&P must be up to 20 days prior to procedure and updates within 24 hours of procedure as applicable: NA  Relevant diagnostic and radiology test results appropriately labeled and displayed as applicable: Yes  Procedure site(s) marked with provider initials: NA    TIMEOUT:  Time-Out performed immediately prior to starting procedure, including verbal and active participation of all team members addressing the following:Yes  * Correct patient identify  * Confirmed that the correct side and site are marked  * An accurate procedure consent form  * Agreement on the procedure to be done  * Correct patient position  * Relevant images and results are properly labeled and appropriately displayed  * The need to administer antibiotics or fluids for irrigation purposes during the procedure as applicable   * Safety precautions based on patient history or medication use    DURING PROCEDURE: Verification of correct person, site, and procedures any time the responsibility for care of the patient is transferred to another member of the care team.       Prior to injection, verified patient identity using patient's name and date of  birth.  Due to injection administration, patient instructed to remain in clinic for 15 minutes  afterwards, and to report any adverse reaction to me immediately.    Joint injection was performed.      Drug Amount Wasted:  Yes: 2 mg/ml   Vial/Syringe: Single dose vial  Expiration Date:  1/2029      Jessie Ovalles ATC  June 30, 2025

## 2025-07-02 LAB
GAMMA INTERFERON BACKGROUND BLD IA-ACNC: 0.04 IU/ML
M TB IFN-G BLD-IMP: NEGATIVE
M TB IFN-G CD4+ BCKGRND COR BLD-ACNC: 9.96 IU/ML
MITOGEN IGNF BCKGRD COR BLD-ACNC: 0 IU/ML
MITOGEN IGNF BCKGRD COR BLD-ACNC: 0.01 IU/ML
QUANTIFERON MITOGEN: 10 IU/ML
QUANTIFERON NIL TUBE: 0.04 IU/ML
QUANTIFERON TB1 TUBE: 0.04 IU/ML
QUANTIFERON TB2 TUBE: 0.05

## 2025-07-08 LAB
CREAT UR-SCNC: 1447.5 UMOL/L
CREATINE 24H UR-MRATE: NORMAL MG/24H
CREATINE/CREAT UR-SRTO: 18 MMOL/MOL CRT

## 2025-07-20 ENCOUNTER — APPOINTMENT (OUTPATIENT)
Dept: GENERAL RADIOLOGY | Facility: CLINIC | Age: 65
End: 2025-07-20
Attending: EMERGENCY MEDICINE
Payer: COMMERCIAL

## 2025-07-20 ENCOUNTER — HOSPITAL ENCOUNTER (OUTPATIENT)
Facility: CLINIC | Age: 65
Setting detail: OBSERVATION
Discharge: NURSING, REHAB OR LONG-TERM CARE | End: 2025-07-21
Attending: EMERGENCY MEDICINE | Admitting: INTERNAL MEDICINE
Payer: COMMERCIAL

## 2025-07-20 ENCOUNTER — APPOINTMENT (OUTPATIENT)
Dept: CT IMAGING | Facility: CLINIC | Age: 65
End: 2025-07-20
Attending: EMERGENCY MEDICINE
Payer: COMMERCIAL

## 2025-07-20 DIAGNOSIS — R77.8 ELEVATED TOTAL PROTEIN: ICD-10-CM

## 2025-07-20 DIAGNOSIS — R79.89 ELEVATED TROPONIN: ICD-10-CM

## 2025-07-20 DIAGNOSIS — G89.29 ACUTE ON CHRONIC BACK PAIN: ICD-10-CM

## 2025-07-20 DIAGNOSIS — M48.061 SPINAL STENOSIS OF LUMBAR REGION, UNSPECIFIED WHETHER NEUROGENIC CLAUDICATION PRESENT: ICD-10-CM

## 2025-07-20 DIAGNOSIS — S22.32XA CLOSED FRACTURE OF ONE RIB OF LEFT SIDE, INITIAL ENCOUNTER: Primary | ICD-10-CM
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
## 2025-07-20 DIAGNOSIS — M54.9 ACUTE ON CHRONIC BACK PAIN: ICD-10-CM

## 2025-07-20 LAB
ALBUMIN SERPL BCG-MCNC: 4.4 G/DL (ref 3.5–5.2)
ALBUMIN UR-MCNC: 30 MG/DL
ALP SERPL-CCNC: 121 U/L (ref 40–150)
ALT SERPL W P-5'-P-CCNC: 31 U/L (ref 0–70)
ANION GAP SERPL CALCULATED.3IONS-SCNC: 13 MMOL/L (ref 7–15)
APPEARANCE UR: CLEAR
AST SERPL W P-5'-P-CCNC: 28 U/L (ref 0–45)
BASOPHILS # BLD AUTO: 0 10E3/UL (ref 0–0.2)
BASOPHILS NFR BLD AUTO: 0 %
BILIRUB SERPL-MCNC: 0.5 MG/DL
BILIRUB UR QL STRIP: NEGATIVE
BUN SERPL-MCNC: 19.9 MG/DL (ref 8–23)
CALCIUM SERPL-MCNC: 9.6 MG/DL (ref 8.8–10.4)
CHLORIDE SERPL-SCNC: 95 MMOL/L (ref 98–107)
COLOR UR AUTO: ABNORMAL
CREAT SERPL-MCNC: 1.16 MG/DL (ref 0.67–1.17)
EGFRCR SERPLBLD CKD-EPI 2021: 71 ML/MIN/1.73M2
EOSINOPHIL # BLD AUTO: 0.2 10E3/UL (ref 0–0.7)
EOSINOPHIL NFR BLD AUTO: 2 %
ERYTHROCYTE [DISTWIDTH] IN BLOOD BY AUTOMATED COUNT: 15.2 % (ref 10–15)
ETHANOL SERPL-MCNC: <0.01 G/DL
GLUCOSE SERPL-MCNC: 253 MG/DL (ref 70–99)
GLUCOSE UR STRIP-MCNC: 50 MG/DL
HCO3 SERPL-SCNC: 27 MMOL/L (ref 22–29)
HCT VFR BLD AUTO: 38.2 % (ref 40–53)
HGB BLD-MCNC: 12.4 G/DL (ref 13.3–17.7)
HGB UR QL STRIP: NEGATIVE
IMM GRANULOCYTES # BLD: 0.1 10E3/UL
IMM GRANULOCYTES NFR BLD: 1 %
INR PPP: 0.89 (ref 0.85–1.15)
KETONES UR STRIP-MCNC: NEGATIVE MG/DL
LEUKOCYTE ESTERASE UR QL STRIP: NEGATIVE
LYMPHOCYTES # BLD AUTO: 1.4 10E3/UL (ref 0.8–5.3)
LYMPHOCYTES NFR BLD AUTO: 19 %
MCH RBC QN AUTO: 26.6 PG (ref 26.5–33)
MCHC RBC AUTO-ENTMCNC: 32.5 G/DL (ref 31.5–36.5)
MCV RBC AUTO: 82 FL (ref 78–100)
MONOCYTES # BLD AUTO: 0.7 10E3/UL (ref 0–1.3)
MONOCYTES NFR BLD AUTO: 10 %
MUCOUS THREADS #/AREA URNS LPF: PRESENT /LPF
NEUTROPHILS # BLD AUTO: 4.9 10E3/UL (ref 1.6–8.3)
NEUTROPHILS NFR BLD AUTO: 67 %
NITRATE UR QL: NEGATIVE
NRBC # BLD AUTO: 0 10E3/UL
NRBC BLD AUTO-RTO: 0 /100
PH UR STRIP: 6.5 [PH] (ref 5–7)
PLATELET # BLD AUTO: 198 10E3/UL (ref 150–450)
POTASSIUM SERPL-SCNC: 3.8 MMOL/L (ref 3.4–5.3)
PROT SERPL-MCNC: 7.1 G/DL (ref 6.4–8.3)
PROTHROMBIN TIME: 12.1 SECONDS (ref 11.8–14.8)
RBC # BLD AUTO: 4.67 10E6/UL (ref 4.4–5.9)
RBC URINE: <1 /HPF
SODIUM SERPL-SCNC: 135 MMOL/L (ref 135–145)
SP GR UR STRIP: 1.02 (ref 1–1.03)
SQUAMOUS EPITHELIAL: <1 /HPF
TROPONIN T SERPL HS-MCNC: 56 NG/L
TROPONIN T SERPL HS-MCNC: 57 NG/L
UROBILINOGEN UR STRIP-MCNC: NORMAL MG/DL
WBC # BLD AUTO: 7.3 10E3/UL (ref 4–11)
WBC URINE: 1 /HPF

## 2025-07-20 PROCEDURE — 73080 X-RAY EXAM OF ELBOW: CPT | Mod: 26 | Performed by: INTERNAL MEDICINE

## 2025-07-20 PROCEDURE — G0378 HOSPITAL OBSERVATION PER HR: HCPCS

## 2025-07-20 PROCEDURE — 73090 X-RAY EXAM OF FOREARM: CPT | Mod: 26 | Performed by: INTERNAL MEDICINE

## 2025-07-20 PROCEDURE — 74176 CT ABD & PELVIS W/O CONTRAST: CPT | Mod: 26 | Performed by: RADIOLOGY

## 2025-07-20 PROCEDURE — 81003 URINALYSIS AUTO W/O SCOPE: CPT | Performed by: EMERGENCY MEDICINE

## 2025-07-20 PROCEDURE — 93005 ELECTROCARDIOGRAM TRACING: CPT

## 2025-07-20 PROCEDURE — 250N000011 HC RX IP 250 OP 636: Performed by: EMERGENCY MEDICINE

## 2025-07-20 PROCEDURE — 73590 X-RAY EXAM OF LOWER LEG: CPT | Mod: 26 | Performed by: INTERNAL MEDICINE

## 2025-07-20 PROCEDURE — 85610 PROTHROMBIN TIME: CPT | Performed by: EMERGENCY MEDICINE

## 2025-07-20 PROCEDURE — 99222 1ST HOSP IP/OBS MODERATE 55: CPT | Performed by: INTERNAL MEDICINE

## 2025-07-20 PROCEDURE — 73610 X-RAY EXAM OF ANKLE: CPT | Mod: LT

## 2025-07-20 PROCEDURE — 73080 X-RAY EXAM OF ELBOW: CPT | Mod: LT

## 2025-07-20 PROCEDURE — 73610 X-RAY EXAM OF ANKLE: CPT | Mod: 26 | Performed by: INTERNAL MEDICINE

## 2025-07-20 PROCEDURE — 96374 THER/PROPH/DIAG INJ IV PUSH: CPT

## 2025-07-20 PROCEDURE — 73060 X-RAY EXAM OF HUMERUS: CPT | Mod: 26 | Performed by: INTERNAL MEDICINE

## 2025-07-20 PROCEDURE — 250N000013 HC RX MED GY IP 250 OP 250 PS 637: Performed by: PHYSICIAN ASSISTANT

## 2025-07-20 PROCEDURE — 999N000104 CT THORACIC SPINE RECONSTRUCTED

## 2025-07-20 PROCEDURE — 99285 EMERGENCY DEPT VISIT HI MDM: CPT | Performed by: EMERGENCY MEDICINE

## 2025-07-20 PROCEDURE — 72128 CT CHEST SPINE W/O DYE: CPT | Mod: 26 | Performed by: STUDENT IN AN ORGANIZED HEALTH CARE EDUCATION/TRAINING PROGRAM

## 2025-07-20 PROCEDURE — 999N000127 HC STATISTIC PERIPHERAL IV START W US GUIDANCE

## 2025-07-20 PROCEDURE — 258N000003 HC RX IP 258 OP 636: Performed by: EMERGENCY MEDICINE

## 2025-07-20 PROCEDURE — 99207 PR APP CREDIT; MD BILLING SHARED VISIT: CPT | Mod: FS | Performed by: PHYSICIAN ASSISTANT

## 2025-07-20 PROCEDURE — 250N000012 HC RX MED GY IP 250 OP 636 PS 637: Performed by: PHYSICIAN ASSISTANT

## 2025-07-20 PROCEDURE — 82077 ASSAY SPEC XCP UR&BREATH IA: CPT | Performed by: EMERGENCY MEDICINE

## 2025-07-20 PROCEDURE — 73060 X-RAY EXAM OF HUMERUS: CPT | Mod: LT

## 2025-07-20 PROCEDURE — 99285 EMERGENCY DEPT VISIT HI MDM: CPT | Mod: 25 | Performed by: EMERGENCY MEDICINE

## 2025-07-20 PROCEDURE — 85004 AUTOMATED DIFF WBC COUNT: CPT | Performed by: EMERGENCY MEDICINE

## 2025-07-20 PROCEDURE — 36415 COLL VENOUS BLD VENIPUNCTURE: CPT | Performed by: EMERGENCY MEDICINE

## 2025-07-20 PROCEDURE — 84484 ASSAY OF TROPONIN QUANT: CPT | Performed by: EMERGENCY MEDICINE

## 2025-07-20 PROCEDURE — 71250 CT THORAX DX C-: CPT | Mod: 26 | Performed by: RADIOLOGY

## 2025-07-20 PROCEDURE — 73090 X-RAY EXAM OF FOREARM: CPT | Mod: LT

## 2025-07-20 PROCEDURE — 72131 CT LUMBAR SPINE W/O DYE: CPT

## 2025-07-20 PROCEDURE — 72131 CT LUMBAR SPINE W/O DYE: CPT | Mod: 26 | Performed by: STUDENT IN AN ORGANIZED HEALTH CARE EDUCATION/TRAINING PROGRAM

## 2025-07-20 PROCEDURE — 74176 CT ABD & PELVIS W/O CONTRAST: CPT

## 2025-07-20 PROCEDURE — 96361 HYDRATE IV INFUSION ADD-ON: CPT

## 2025-07-20 PROCEDURE — 82040 ASSAY OF SERUM ALBUMIN: CPT | Performed by: EMERGENCY MEDICINE

## 2025-07-20 PROCEDURE — 250N000013 HC RX MED GY IP 250 OP 250 PS 637: Performed by: EMERGENCY MEDICINE

## 2025-07-20 PROCEDURE — 73590 X-RAY EXAM OF LOWER LEG: CPT | Mod: LT

## 2025-07-20 PROCEDURE — 999N000285 HC STATISTIC VASC ACCESS LAB DRAW WITH PIV START

## 2025-07-20 RX ORDER — HYDROMORPHONE HYDROCHLORIDE 1 MG/ML
0.5 INJECTION, SOLUTION INTRAMUSCULAR; INTRAVENOUS; SUBCUTANEOUS ONCE
Refills: 0 | Status: COMPLETED | OUTPATIENT
Start: 2025-07-20 | End: 2025-07-20

## 2025-07-20 RX ORDER — LISINOPRIL 40 MG/1
40 TABLET ORAL DAILY
Status: DISCONTINUED | OUTPATIENT
Start: 2025-07-21 | End: 2025-07-20

## 2025-07-20 RX ORDER — ATORVASTATIN CALCIUM 40 MG/1
40 TABLET, FILM COATED ORAL DAILY
Status: DISCONTINUED | OUTPATIENT
Start: 2025-07-21 | End: 2025-07-21 | Stop reason: HOSPADM

## 2025-07-20 RX ORDER — ATORVASTATIN CALCIUM 40 MG/1
40 TABLET, FILM COATED ORAL DAILY
COMMUNITY
Start: 2025-05-05

## 2025-07-20 RX ORDER — OXYCODONE HYDROCHLORIDE 10 MG/1
10 TABLET ORAL EVERY 6 HOURS PRN
Refills: 0 | Status: DISCONTINUED | OUTPATIENT
Start: 2025-07-20 | End: 2025-07-21 | Stop reason: HOSPADM

## 2025-07-20 RX ORDER — FENOFIBRATE 160 MG/1
160 TABLET ORAL DAILY
Status: DISCONTINUED | OUTPATIENT
Start: 2025-07-21 | End: 2025-07-21 | Stop reason: HOSPADM

## 2025-07-20 RX ORDER — FUROSEMIDE 40 MG/1
40 TABLET ORAL DAILY
Status: DISCONTINUED | OUTPATIENT
Start: 2025-07-21 | End: 2025-07-21 | Stop reason: HOSPADM

## 2025-07-20 RX ORDER — ONDANSETRON 4 MG/1
4 TABLET, ORALLY DISINTEGRATING ORAL EVERY 6 HOURS PRN
Status: DISCONTINUED | OUTPATIENT
Start: 2025-07-20 | End: 2025-07-21 | Stop reason: HOSPADM

## 2025-07-20 RX ORDER — CARVEDILOL 25 MG/1
25 TABLET ORAL 2 TIMES DAILY WITH MEALS
COMMUNITY
Start: 2024-01-29

## 2025-07-20 RX ORDER — PANTOPRAZOLE SODIUM 40 MG/1
40 TABLET, DELAYED RELEASE ORAL
Status: DISCONTINUED | OUTPATIENT
Start: 2025-07-21 | End: 2025-07-21 | Stop reason: HOSPADM

## 2025-07-20 RX ORDER — CYCLOBENZAPRINE HCL 5 MG
10 TABLET ORAL 3 TIMES DAILY PRN
Status: DISCONTINUED | OUTPATIENT
Start: 2025-07-20 | End: 2025-07-21 | Stop reason: HOSPADM

## 2025-07-20 RX ORDER — OXYCODONE HYDROCHLORIDE 5 MG/1
5 TABLET ORAL EVERY 4 HOURS PRN
Refills: 0 | Status: DISCONTINUED | OUTPATIENT
Start: 2025-07-20 | End: 2025-07-21 | Stop reason: HOSPADM

## 2025-07-20 RX ORDER — HYDROCHLOROTHIAZIDE 12.5 MG/1
12.5 TABLET ORAL DAILY
Status: ON HOLD | COMMUNITY
Start: 2024-09-04 | End: 2025-07-21

## 2025-07-20 RX ORDER — OXYCODONE HYDROCHLORIDE 5 MG/1
5 TABLET ORAL ONCE
Refills: 0 | Status: COMPLETED | OUTPATIENT
Start: 2025-07-20 | End: 2025-07-20

## 2025-07-20 RX ORDER — BUPRENORPHINE AND NALOXONE 8; 2 MG/1; MG/1
1 FILM, SOLUBLE BUCCAL; SUBLINGUAL 3 TIMES DAILY
Status: DISCONTINUED | OUTPATIENT
Start: 2025-07-20 | End: 2025-07-21 | Stop reason: HOSPADM

## 2025-07-20 RX ORDER — AMLODIPINE BESYLATE 10 MG/1
10 TABLET ORAL DAILY
Status: DISCONTINUED | OUTPATIENT
Start: 2025-07-21 | End: 2025-07-21 | Stop reason: HOSPADM

## 2025-07-20 RX ORDER — LIDOCAINE 4 G/G
3 PATCH TOPICAL DAILY
Status: DISCONTINUED | OUTPATIENT
Start: 2025-07-20 | End: 2025-07-21 | Stop reason: HOSPADM

## 2025-07-20 RX ORDER — FUROSEMIDE 20 MG/1
40 TABLET ORAL DAILY
COMMUNITY
Start: 2025-04-07

## 2025-07-20 RX ORDER — AMOXICILLIN 250 MG
1 CAPSULE ORAL 2 TIMES DAILY PRN
Status: DISCONTINUED | OUTPATIENT
Start: 2025-07-20 | End: 2025-07-21 | Stop reason: HOSPADM

## 2025-07-20 RX ORDER — ESCITALOPRAM OXALATE 20 MG/1
20 TABLET ORAL AT BEDTIME
Status: DISCONTINUED | OUTPATIENT
Start: 2025-07-20 | End: 2025-07-21 | Stop reason: HOSPADM

## 2025-07-20 RX ORDER — ONDANSETRON 2 MG/ML
4 INJECTION INTRAMUSCULAR; INTRAVENOUS EVERY 6 HOURS PRN
Status: DISCONTINUED | OUTPATIENT
Start: 2025-07-20 | End: 2025-07-21 | Stop reason: HOSPADM

## 2025-07-20 RX ORDER — AMLODIPINE BESYLATE 10 MG/1
1 TABLET ORAL DAILY
COMMUNITY
Start: 2024-12-27

## 2025-07-20 RX ORDER — ACETAMINOPHEN 500 MG
1000 TABLET ORAL 3 TIMES DAILY
Status: DISCONTINUED | OUTPATIENT
Start: 2025-07-20 | End: 2025-07-21 | Stop reason: HOSPADM

## 2025-07-20 RX ORDER — BUPRENORPHINE AND NALOXONE 8; 2 MG/1; MG/1
1 FILM, SOLUBLE BUCCAL; SUBLINGUAL 3 TIMES DAILY
COMMUNITY
Start: 2025-07-11

## 2025-07-20 RX ORDER — CYCLOBENZAPRINE HCL 10 MG
10 TABLET ORAL 3 TIMES DAILY PRN
COMMUNITY
Start: 2025-03-24

## 2025-07-20 RX ORDER — AMOXICILLIN 250 MG
2 CAPSULE ORAL 2 TIMES DAILY PRN
Status: DISCONTINUED | OUTPATIENT
Start: 2025-07-20 | End: 2025-07-21 | Stop reason: HOSPADM

## 2025-07-20 RX ORDER — CARVEDILOL 25 MG/1
25 TABLET ORAL 2 TIMES DAILY WITH MEALS
Status: DISCONTINUED | OUTPATIENT
Start: 2025-07-20 | End: 2025-07-21 | Stop reason: HOSPADM

## 2025-07-20 RX ADMIN — BUPRENORPHINE AND NALOXONE 1 FILM: 8; 2 FILM, SOLUBLE BUCCAL; SUBLINGUAL at 22:37

## 2025-07-20 RX ADMIN — HYDROMORPHONE HYDROCHLORIDE 0.5 MG: 1 INJECTION, SOLUTION INTRAMUSCULAR; INTRAVENOUS; SUBCUTANEOUS at 21:55

## 2025-07-20 RX ADMIN — CARVEDILOL 25 MG: 25 TABLET, FILM COATED ORAL at 21:55

## 2025-07-20 RX ADMIN — METFORMIN HYDROCHLORIDE 500 MG: 500 TABLET ORAL at 22:37

## 2025-07-20 RX ADMIN — LIDOCAINE 3 PATCH: 4 PATCH TOPICAL at 21:55

## 2025-07-20 RX ADMIN — OXYCODONE HYDROCHLORIDE 5 MG: 5 TABLET ORAL at 17:09

## 2025-07-20 RX ADMIN — ESCITALOPRAM OXALATE 20 MG: 20 TABLET ORAL at 22:37

## 2025-07-20 RX ADMIN — SODIUM CHLORIDE 500 ML: 0.9 INJECTION, SOLUTION INTRAVENOUS at 17:23

## 2025-07-20 RX ADMIN — OXYCODONE HYDROCHLORIDE 5 MG: 5 TABLET ORAL at 18:42

## 2025-07-20 RX ADMIN — ACETAMINOPHEN 1000 MG: 500 TABLET ORAL at 21:55

## 2025-07-20 ASSESSMENT — ACTIVITIES OF DAILY LIVING (ADL)
ADLS_ACUITY_SCORE: 41

## 2025-07-20 ASSESSMENT — COLUMBIA-SUICIDE SEVERITY RATING SCALE - C-SSRS
2. HAVE YOU ACTUALLY HAD ANY THOUGHTS OF KILLING YOURSELF IN THE PAST MONTH?: NO
6. HAVE YOU EVER DONE ANYTHING, STARTED TO DO ANYTHING, OR PREPARED TO DO ANYTHING TO END YOUR LIFE?: NO
1. IN THE PAST MONTH, HAVE YOU WISHED YOU WERE DEAD OR WISHED YOU COULD GO TO SLEEP AND NOT WAKE UP?: NO
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
## 2025-07-20 NOTE — ED NOTES
Bed: ED16  Expected date:   Expected time:   Means of arrival:   Comments:  [facility] boarder
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
## 2025-07-21 VITALS
HEART RATE: 78 BPM | TEMPERATURE: 97.7 F | RESPIRATION RATE: 18 BRPM | HEIGHT: 66 IN | SYSTOLIC BLOOD PRESSURE: 131 MMHG | OXYGEN SATURATION: 95 % | WEIGHT: 204 LBS | DIASTOLIC BLOOD PRESSURE: 95 MMHG | BODY MASS INDEX: 32.78 KG/M2

## 2025-07-21 LAB
ANION GAP SERPL CALCULATED.3IONS-SCNC: 14 MMOL/L (ref 7–15)
BUN SERPL-MCNC: 17.4 MG/DL (ref 8–23)
CALCIUM SERPL-MCNC: 9.5 MG/DL (ref 8.8–10.4)
CHLORIDE SERPL-SCNC: 100 MMOL/L (ref 98–107)
CREAT SERPL-MCNC: 1 MG/DL (ref 0.67–1.17)
EGFRCR SERPLBLD CKD-EPI 2021: 85 ML/MIN/1.73M2
ERYTHROCYTE [DISTWIDTH] IN BLOOD BY AUTOMATED COUNT: 15.4 % (ref 10–15)
GLUCOSE BLDC GLUCOMTR-MCNC: 179 MG/DL (ref 70–99)
GLUCOSE SERPL-MCNC: 183 MG/DL (ref 70–99)
HCO3 SERPL-SCNC: 19 MMOL/L (ref 22–29)
HCT VFR BLD AUTO: 44 % (ref 40–53)
HGB BLD-MCNC: 13.3 G/DL (ref 13.3–17.7)
MCH RBC QN AUTO: 26.8 PG (ref 26.5–33)
MCHC RBC AUTO-ENTMCNC: 30.2 G/DL (ref 31.5–36.5)
MCV RBC AUTO: 89 FL (ref 78–100)
PLATELET # BLD AUTO: 187 10E3/UL (ref 150–450)
POTASSIUM SERPL-SCNC: 4.4 MMOL/L (ref 3.4–5.3)
RBC # BLD AUTO: 4.97 10E6/UL (ref 4.4–5.9)
SODIUM SERPL-SCNC: 133 MMOL/L (ref 135–145)
WBC # BLD AUTO: 5.8 10E3/UL (ref 4–11)

## 2025-07-21 PROCEDURE — 250N000013 HC RX MED GY IP 250 OP 250 PS 637: Performed by: PHYSICIAN ASSISTANT

## 2025-07-21 PROCEDURE — G0378 HOSPITAL OBSERVATION PER HR: HCPCS

## 2025-07-21 PROCEDURE — 82962 GLUCOSE BLOOD TEST: CPT

## 2025-07-21 PROCEDURE — 36415 COLL VENOUS BLD VENIPUNCTURE: CPT | Performed by: PHYSICIAN ASSISTANT

## 2025-07-21 PROCEDURE — 250N000012 HC RX MED GY IP 250 OP 636 PS 637: Performed by: PHYSICIAN ASSISTANT

## 2025-07-21 PROCEDURE — 99239 HOSP IP/OBS DSCHRG MGMT >30: CPT

## 2025-07-21 PROCEDURE — 80048 BASIC METABOLIC PNL TOTAL CA: CPT | Performed by: PHYSICIAN ASSISTANT

## 2025-07-21 PROCEDURE — 85018 HEMOGLOBIN: CPT | Performed by: PHYSICIAN ASSISTANT

## 2025-07-21 RX ORDER — OXYCODONE HYDROCHLORIDE 5 MG/1
5-10 TABLET ORAL EVERY 6 HOURS PRN
Qty: 10 TABLET | Refills: 0 | Status: SHIPPED | OUTPATIENT
Start: 2025-07-21

## 2025-07-21 RX ORDER — LIDOCAINE 4 G/G
3 PATCH TOPICAL DAILY
Qty: 15 PATCH | Refills: 0 | Status: SHIPPED | OUTPATIENT
Start: 2025-07-21 | End: 2025-07-21

## 2025-07-21 RX ORDER — OXYCODONE HYDROCHLORIDE 5 MG/1
5 TABLET ORAL EVERY 6 HOURS PRN
Qty: 6 TABLET | Refills: 0 | Status: SHIPPED | OUTPATIENT
Start: 2025-07-21 | End: 2025-07-21

## 2025-07-21 RX ORDER — HALOPERIDOL 5 MG/1
5 TABLET ORAL
Status: DISCONTINUED | OUTPATIENT
Start: 2025-07-21 | End: 2025-07-21 | Stop reason: HOSPADM

## 2025-07-21 RX ORDER — LIDOCAINE 4 G/G
3 PATCH TOPICAL DAILY
Qty: 15 PATCH | Refills: 0 | Status: SHIPPED | OUTPATIENT
Start: 2025-07-21

## 2025-07-21 RX ADMIN — METFORMIN HYDROCHLORIDE 500 MG: 500 TABLET ORAL at 08:22

## 2025-07-21 RX ADMIN — FENOFIBRATE 160 MG: 160 TABLET ORAL at 08:21

## 2025-07-21 RX ADMIN — OXYCODONE HYDROCHLORIDE 10 MG: 10 TABLET ORAL at 06:53

## 2025-07-21 RX ADMIN — OXYCODONE HYDROCHLORIDE 10 MG: 10 TABLET ORAL at 01:13

## 2025-07-21 RX ADMIN — CYCLOBENZAPRINE HYDROCHLORIDE 10 MG: 5 TABLET, FILM COATED ORAL at 08:21

## 2025-07-21 RX ADMIN — CARVEDILOL 25 MG: 25 TABLET, FILM COATED ORAL at 08:21

## 2025-07-21 RX ADMIN — ATORVASTATIN CALCIUM 40 MG: 40 TABLET, FILM COATED ORAL at 08:21

## 2025-07-21 RX ADMIN — ACETAMINOPHEN 1000 MG: 500 TABLET ORAL at 08:21

## 2025-07-21 RX ADMIN — FUROSEMIDE 40 MG: 40 TABLET ORAL at 08:22

## 2025-07-21 RX ADMIN — BUPRENORPHINE AND NALOXONE 1 FILM: 8; 2 FILM, SOLUBLE BUCCAL; SUBLINGUAL at 08:21

## 2025-07-21 RX ADMIN — PANTOPRAZOLE SODIUM 40 MG: 40 TABLET, DELAYED RELEASE ORAL at 08:22

## 2025-07-21 RX ADMIN — AMLODIPINE BESYLATE 10 MG: 10 TABLET ORAL at 08:21

## 2025-07-21 RX ADMIN — CYCLOBENZAPRINE HYDROCHLORIDE 10 MG: 5 TABLET, FILM COATED ORAL at 00:01

## 2025-07-21 ASSESSMENT — ACTIVITIES OF DAILY LIVING (ADL)
ADLS_ACUITY_SCORE: 21
DEPENDENT_IADLS:: CLEANING;COOKING;LAUNDRY;MEAL PREPARATION;SHOPPING;TRANSPORTATION;MEDICATION MANAGEMENT
ADLS_ACUITY_SCORE: 21
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
## 2025-07-21 NOTE — CONSULTS
Care Management Initial Consult    General Information  Assessment completed with: Patient, Care Team Member, Other (Sister Lillian), [facility] (group home), and sister Lillian  Type of CM/SW Visit: Initial Assessment    Primary Care Provider verified and updated as needed: Yes ([facility], [phone])   Readmission within the last 30 days: no previous admission in last 30 days      Reason for Consult: discharge planning  Advance Care Planning: Advance Care Planning Reviewed: no concerns identified          Communication Assessment  Patient's communication style: spoken language (English or Bilingual)    Hearing Difficulty or Deaf: no   Wear Glasses or Blind: no    Cognitive  Cognitive/Neuro/Behavioral: WDL                      Living Environment:   People in home: facility resident     Current living Arrangements: group home      Able to return to prior arrangements: yes       Family/Social Support:  Care provided by: self, other (see comments) (Group home staff.)  Provides care for: no one, unable/limited ability to care for self  Marital Status:   Support system: Sibling(s), Children, Facility resident(s)/Staff          Description of Support System: Supportive, Involved    Support Assessment: Adequate social supports, Adequate family and caregiver support    Current Resources:   Patient receiving home care services: No  Community Resources: [facility]  Equipment currently used at home: cane, straight, walker, standard  Supplies currently used at home:      Employment/Financial:  Employment Status: retired        Financial Concerns: none   Does the patient's insurance plan have a 3 day qualifying hospital stay waiver?  No    Lifestyle & Psychosocial Needs:  Social Drivers of Health     Food Insecurity: Low Risk  (7/20/2025)    Food Insecurity     Within the past 12 months, did you worry that your food would run out before you got money to buy more?: No     Within the past 12 months, did the food  you bought just not last and you didn t have money to get more?: No   Depression: Not at risk (6/2/2025)    PHQ-2     PHQ-2 Score: 0   Housing Stability: High Risk (7/20/2025)    Housing Stability     Do you have housing? : No     Are you worried about losing your housing?: No   Tobacco Use: Low Risk  (6/14/2025)    Received from [facility]    Patient History     Smoking Tobacco Use: Never     Smokeless Tobacco Use: Never     Passive Exposure: Not on file   Financial Resource Strain: Low Risk  (7/20/2025)    Financial Resource Strain     Within the past 12 months, have you or your family members you live with been unable to get utilities (heat, electricity) when it was really needed?: No   Alcohol Use: Not on file   Transportation Needs: Low Risk  (7/20/2025)    Transportation Needs     Within the past 12 months, has lack of transportation kept you from medical appointments, getting your medicines, non-medical meetings or appointments, work, or from getting things that you need?: No   Physical Activity: Unknown (6/24/2025)    Exercise Vital Sign     Days of Exercise per Week: 0 days     Minutes of Exercise per Session: Not on file   Interpersonal Safety: Patient Unable To Answer (9/13/2024)    Received from [facility]    Humiliation, Afraid, Rape, and Kick questionnaire     Fear of Current or Ex-Partner: Patient unable to answer     Emotionally Abused: Patient unable to answer     Physically Abused: Patient unable to answer     Sexually Abused: Patient unable to answer   Stress: Not on File (7/26/2023)    Received from [facility]    Stress     Stress: 0   Recent Concern: Stress - At Risk (7/26/2023)    Received from [facility]    Stress     Stress: 2   Social Connections: Not on File (7/26/2023)    Received from [facility]    Social Connections     Connectedness: 0   Recent Concern: Social Connections - At Risk (7/26/2023)    Received from [facility]    Social Connections     Social Connections and Isolation: 2    Health Literacy: Not on file     Functional Status:  Prior to admission patient needed assistance:   Dependent ADLs:: Bathing  Dependent IADLs:: Cleaning, Cooking, Laundry, Meal Preparation, Shopping, Transportation, Medication Management  Mental Health Status:  Mental Health Status: No Current Concerns       Chemical Dependency Status:  Chemical Dependency Status: No Current Concerns     Values/Beliefs:  Spiritual, Cultural Beliefs, Congregation Practices, Values that affect care: no       Discussed  Partnership in Safe Discharge Planning  document with patient/family: No    Additional Information:  Obs 1 provider informed BABS pt needs a ride. BABS met with pt at bedside and introduced self,role and explained purpose of assessment. SW verified PCP,address, and insurance. Pt reported he lives in [facility] and they help with bathing, and IADLs needs. Pt reported he has a cane, walker, shower chair and has a Cadi waiver and Cadi .  Pt reported his group home owner is Donny and they help with all of his needs. Pt shared his sister Lillian, daughter Kindra and his other siblings are his support system and involved in his care. Pt shared he has no mental health, chemical dependency, financial and transportation concerns. Pt called his sister Lillian to get group contacts and she shared Donny can be reached at [phone].  SW called group home owner [facility] and he confirmed the above information and shared their pharmacy is [facility] ([address]) and discharge orders can be faxed to [phone]. BABS updated Obs1 provider that pt is coming from group home and his medications should be sent to [facility].     BABS faxed discharge orders to [facility] and [facility]. BABS called [employer] ride and arranged a cab ride through [employer] at 12:00pm. BABS updated charge nurse. BABS received a message from pt's bedside nurse that provider made changes to pt's orders. BABS faxed new  discharge orders to [facility] and [facility]. BABS called [facility] and updated on ride time and the changes in discharge orders.     Next Steps:     No further care management intervention anticipated at this time. Please re-consult if further needs arise. Care management signing off.     LUCIAN Hayward,ROBERT   Obs/ED    [facility] Acute Care Management  Phone: [phone]  Available on Vocera:  1A Obs BABS
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
## 2025-07-21 NOTE — PLAN OF CARE
"Goal Outcome Evaluation:    BP (!) 150/103 (BP Location: Right arm, Cuff Size: Adult Large)   Pulse 98   Temp 97.4  F (36.3  C) (Oral)   Resp 16   Ht 1.676 m (5' 6\")   Wt 92.5 kg (204 lb)   SpO2 100%   BMI 32.93 kg/m      -diagnostic tests and consults completed and resulted - MET  -vital signs normal or at patient baseline - MET  -returns to baseline functional status - MET  -safe disposition plan has been identified -MET   "

## 2025-07-21 NOTE — PROGRESS NOTES
: -diagnostic tests and consults completed and resulted-Not met  -vital signs normal or at patient baseline-Met; vitally stable  -returns to baseline functional status-Not met  -safe disposition plan has been identified -Not met    PRIMARY DIAGNOSIS: ACUTE PAIN  OUTPATIENT/OBSERVATION GOALS TO BE MET BEFORE DISCHARGE:  1. Pain Status: No improvement noted. Consider adjustment in pain regimen.    2. Return to near baseline physical activity: No    3. Cleared for discharge by consultants (if involved): No    Discharge Planner Nurse   Safe discharge environment identified: No  Barriers to discharge: Yes; uncontrolled pain       Entered by: Paulina Mena RN 07/21/2025 0000     Please review provider order for any additional goals.   Nurse to notify provider when observation goals have been met and patient is ready for discharge.

## 2025-07-21 NOTE — PROGRESS NOTES
"Patient is refusing bedrest. Patient insists on sitting up in bed and demanded to be taken to the restroom. This writer explained his bedrest orders and that he could seriously injury himself if he didn't comply with the MD's orders. Patient acknowledge what I explained to him, but expressed his wishes to be up and would \"take the risk.\" The NST assisted patient to the restroom via wheelchair as the patient requested.     RN notified by Charge VALENTIN Richard and page sent to provider.   "

## 2025-07-21 NOTE — PLAN OF CARE
Goal Outcome Evaluation:    DISCHARGE                          ----------------------------------------------------------------------------  Discharged to: Home  Via: Automobile  Accompanied by: Family  Discharge Instructions: diet, activity, medications, follow up appointments, when to call the MD, aftercare instructions, and what to watchout for (i.e. s/s of infection, increasing SOB, palpitations, chest pain,). AVS reviewed and sent with patient.   Prescriptions: To be filled by Delivery pharmacy per pt's Group home request request; medication list reviewed & sent with pt  Follow Up Appointments: arranged; information given  Belongings: All sent with pt  IV: out  Telemetry: off  Pt exhibits understanding of above discharge instructions; all questions answered.    Discharge Paperwork: Signed, copied, and sent home with patient.     Michelle Rosen RN on 7/21/2025 at 11:33 AM
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
## 2025-07-21 NOTE — PROGRESS NOTES
Brief cross cover note:     Paged by ED regarding update on this pt. There was new over-read on CT scan which shows acute rib fracture. Will order incentive spirometry Qshift--lidocaine patches and multi-modal pain control already ordered.     Henrique Chi,  07/20/25 at 11:44 PM

## 2025-07-21 NOTE — PROGRESS NOTES
PRIMARY DIAGNOSIS: ACUTE PAIN  OUTPATIENT/OBSERVATION GOALS TO BE MET BEFORE DISCHARGE:  1. Pain Status: No improvement noted. Consider adjustment in pain regimen. Patient was reporting uncontrolled pain on his back and left side and reported being unable to sleep due to the pain.Provider notified and haloperidol ordered. Patient declined haloperidol.    2. Return to near baseline physical activity: No    3. Cleared for discharge by consultants (if involved): No    Discharge Planner Nurse   Safe discharge environment identified: No  Barriers to discharge: Yes       Entered by:  Paulina Mena RN 07/21/2025 0600     Please review provider order for any additional goals.   Nurse to notify provider when observation goals have been met and patient is ready for discharge.

## 2025-07-21 NOTE — PROGRESS NOTES
Patient reporting uncontrolled pain, stating he is unable to sleep due to pain. He is rating his pain at 9/10. Oxy. 10 mg last administered @ 0113; patient has nothing currently due for pain management. Provider notified.

## 2025-07-22 LAB
ATRIAL RATE - MUSE: 81 BPM
DIASTOLIC BLOOD PRESSURE - MUSE: NORMAL MMHG
INTERPRETATION ECG - MUSE: NORMAL
P AXIS - MUSE: 67 DEGREES
PR INTERVAL - MUSE: 148 MS
QRS DURATION - MUSE: 80 MS
QT - MUSE: 378 MS
QTC - MUSE: 439 MS
R AXIS - MUSE: 72 DEGREES
SYSTOLIC BLOOD PRESSURE - MUSE: NORMAL MMHG
T AXIS - MUSE: 11 DEGREES
VENTRICULAR RATE- MUSE: 81 BPM

## 2025-08-17 ENCOUNTER — HEALTH MAINTENANCE LETTER (OUTPATIENT)
Age: 65
End: 2025-08-17

## 2025-08-25 ENCOUNTER — TRANSCRIBE ORDERS (OUTPATIENT)
Dept: OTHER | Age: 65
End: 2025-08-25

## 2025-08-25 ENCOUNTER — MEDICAL CORRESPONDENCE (OUTPATIENT)
Dept: HEALTH INFORMATION MANAGEMENT | Facility: CLINIC | Age: 65
End: 2025-08-25

## 2025-08-25 ENCOUNTER — LAB REQUISITION (OUTPATIENT)
Dept: LAB | Facility: CLINIC | Age: 65
End: 2025-08-25
Payer: COMMERCIAL

## 2025-08-25 DIAGNOSIS — E11.40 TYPE 2 DIABETES MELLITUS WITH DIABETIC NEUROPATHY, WITHOUT LONG-TERM CURRENT USE OF INSULIN (H): ICD-10-CM

## 2025-08-25 DIAGNOSIS — Z13.220 ENCOUNTER FOR SCREENING FOR LIPOID DISORDERS: ICD-10-CM

## 2025-08-25 DIAGNOSIS — I10 ESSENTIAL (PRIMARY) HYPERTENSION: ICD-10-CM

## 2025-08-25 LAB
ANION GAP SERPL CALCULATED.3IONS-SCNC: 16 MMOL/L (ref 7–15)
BUN SERPL-MCNC: 20 MG/DL (ref 8–23)
CALCIUM SERPL-MCNC: 10.1 MG/DL (ref 8.8–10.4)
CHLORIDE SERPL-SCNC: 93 MMOL/L (ref 98–107)
CHOLEST SERPL-MCNC: 165 MG/DL
CREAT SERPL-MCNC: 1.31 MG/DL (ref 0.67–1.17)
EGFRCR SERPLBLD CKD-EPI 2021: 61 ML/MIN/1.73M2
FASTING STATUS PATIENT QL REPORTED: YES
FASTING STATUS PATIENT QL REPORTED: YES
GLUCOSE SERPL-MCNC: 184 MG/DL (ref 70–99)
HCO3 SERPL-SCNC: 28 MMOL/L (ref 22–29)
HDLC SERPL-MCNC: 37 MG/DL
LDLC SERPL CALC-MCNC: 85 MG/DL
NONHDLC SERPL-MCNC: 128 MG/DL
POTASSIUM SERPL-SCNC: 4 MMOL/L (ref 3.4–5.3)
SODIUM SERPL-SCNC: 137 MMOL/L (ref 135–145)
TRIGL SERPL-MCNC: 217 MG/DL

## 2025-08-26 ENCOUNTER — PATIENT OUTREACH (OUTPATIENT)
Dept: CARE COORDINATION | Facility: CLINIC | Age: 65
End: 2025-08-26
Payer: COMMERCIAL

## 2025-08-28 ENCOUNTER — PATIENT OUTREACH (OUTPATIENT)
Dept: CARE COORDINATION | Facility: CLINIC | Age: 65
End: 2025-08-28
Payer: COMMERCIAL

## (undated) RX ORDER — LIDOCAINE HYDROCHLORIDE 10 MG/ML
INJECTION, SOLUTION EPIDURAL; INFILTRATION; INTRACAUDAL; PERINEURAL
Status: DISPENSED
Start: 2025-06-30

## (undated) RX ORDER — LIDOCAINE HYDROCHLORIDE 10 MG/ML
INJECTION, SOLUTION EPIDURAL; INFILTRATION; INTRACAUDAL; PERINEURAL
Status: DISPENSED
Start: 2025-06-24

## (undated) RX ORDER — LIDOCAINE HYDROCHLORIDE 10 MG/ML
INJECTION, SOLUTION EPIDURAL; INFILTRATION; INTRACAUDAL; PERINEURAL
Status: DISPENSED
Start: 2024-11-18

## (undated) RX ORDER — TRIAMCINOLONE ACETONIDE 40 MG/ML
INJECTION, SUSPENSION INTRA-ARTICULAR; INTRAMUSCULAR
Status: DISPENSED
Start: 2024-11-18

## (undated) RX ORDER — TRIAMCINOLONE ACETONIDE 40 MG/ML
INJECTION, SUSPENSION INTRA-ARTICULAR; INTRAMUSCULAR
Status: DISPENSED
Start: 2025-06-24

## (undated) RX ORDER — TRIAMCINOLONE ACETONIDE 40 MG/ML
INJECTION, SUSPENSION INTRA-ARTICULAR; INTRAMUSCULAR
Status: DISPENSED
Start: 2025-06-30

## (undated) RX ORDER — BUPIVACAINE HYDROCHLORIDE 5 MG/ML
INJECTION, SOLUTION EPIDURAL; INTRACAUDAL; PERINEURAL
Status: DISPENSED
Start: 2025-06-24